# Patient Record
Sex: FEMALE | Race: BLACK OR AFRICAN AMERICAN | NOT HISPANIC OR LATINO
[De-identification: names, ages, dates, MRNs, and addresses within clinical notes are randomized per-mention and may not be internally consistent; named-entity substitution may affect disease eponyms.]

---

## 2017-02-06 ENCOUNTER — APPOINTMENT (OUTPATIENT)
Dept: SURGERY | Facility: CLINIC | Age: 27
End: 2017-02-06

## 2017-02-06 VITALS
RESPIRATION RATE: 16 BRPM | BODY MASS INDEX: 23.7 KG/M2 | OXYGEN SATURATION: 99 % | WEIGHT: 175 LBS | HEART RATE: 56 BPM | HEIGHT: 72 IN | SYSTOLIC BLOOD PRESSURE: 102 MMHG | TEMPERATURE: 98.4 F | DIASTOLIC BLOOD PRESSURE: 70 MMHG

## 2017-02-06 RX ORDER — CALCIUM CARBONATE 500(1250)
500 TABLET ORAL
Refills: 0 | Status: ACTIVE | COMMUNITY

## 2017-07-31 ENCOUNTER — APPOINTMENT (OUTPATIENT)
Dept: SURGERY | Facility: CLINIC | Age: 27
End: 2017-07-31

## 2017-11-13 ENCOUNTER — APPOINTMENT (OUTPATIENT)
Dept: SURGERY | Facility: CLINIC | Age: 27
End: 2017-11-13
Payer: MEDICAID

## 2017-11-13 VITALS
WEIGHT: 173 LBS | HEART RATE: 71 BPM | OXYGEN SATURATION: 98 % | RESPIRATION RATE: 16 BRPM | HEIGHT: 72 IN | BODY MASS INDEX: 23.43 KG/M2 | DIASTOLIC BLOOD PRESSURE: 81 MMHG | TEMPERATURE: 98.4 F | SYSTOLIC BLOOD PRESSURE: 121 MMHG

## 2017-11-13 PROCEDURE — 99213 OFFICE O/P EST LOW 20 MIN: CPT

## 2018-01-01 ENCOUNTER — OUTPATIENT (OUTPATIENT)
Dept: OUTPATIENT SERVICES | Facility: HOSPITAL | Age: 28
LOS: 1 days | End: 2018-01-01
Payer: MEDICAID

## 2018-01-01 DIAGNOSIS — Z98.84 BARIATRIC SURGERY STATUS: Chronic | ICD-10-CM

## 2018-01-01 DIAGNOSIS — Z98.89 OTHER SPECIFIED POSTPROCEDURAL STATES: Chronic | ICD-10-CM

## 2018-01-01 PROCEDURE — G9001: CPT

## 2018-01-05 ENCOUNTER — EMERGENCY (EMERGENCY)
Facility: HOSPITAL | Age: 28
LOS: 1 days | Discharge: HOME | End: 2018-01-05
Attending: EMERGENCY MEDICINE | Admitting: EMERGENCY MEDICINE
Payer: MEDICAID

## 2018-01-05 VITALS
OXYGEN SATURATION: 100 % | RESPIRATION RATE: 18 BRPM | SYSTOLIC BLOOD PRESSURE: 105 MMHG | DIASTOLIC BLOOD PRESSURE: 59 MMHG | HEART RATE: 94 BPM

## 2018-01-05 VITALS
TEMPERATURE: 99 F | RESPIRATION RATE: 18 BRPM | DIASTOLIC BLOOD PRESSURE: 65 MMHG | HEART RATE: 90 BPM | OXYGEN SATURATION: 100 % | SYSTOLIC BLOOD PRESSURE: 137 MMHG

## 2018-01-05 DIAGNOSIS — Z98.84 BARIATRIC SURGERY STATUS: Chronic | ICD-10-CM

## 2018-01-05 DIAGNOSIS — Z98.89 OTHER SPECIFIED POSTPROCEDURAL STATES: Chronic | ICD-10-CM

## 2018-01-05 LAB
ALBUMIN SERPL ELPH-MCNC: 4.4 G/DL — SIGNIFICANT CHANGE UP (ref 3.3–5)
ALP SERPL-CCNC: 56 U/L — SIGNIFICANT CHANGE UP (ref 40–120)
ALT FLD-CCNC: 18 U/L — SIGNIFICANT CHANGE UP (ref 4–33)
AMPHET UR-MCNC: NEGATIVE — SIGNIFICANT CHANGE UP
APAP SERPL-MCNC: < 15 UG/ML — LOW (ref 15–25)
APPEARANCE UR: SIGNIFICANT CHANGE UP
AST SERPL-CCNC: 23 U/L — SIGNIFICANT CHANGE UP (ref 4–32)
BARBITURATES MEASUREMENT: NEGATIVE — SIGNIFICANT CHANGE UP
BARBITURATES UR SCN-MCNC: NEGATIVE — SIGNIFICANT CHANGE UP
BASOPHILS # BLD AUTO: 0.05 K/UL — SIGNIFICANT CHANGE UP (ref 0–0.2)
BASOPHILS NFR BLD AUTO: 0.5 % — SIGNIFICANT CHANGE UP (ref 0–2)
BENZODIAZ SERPL-MCNC: NEGATIVE — SIGNIFICANT CHANGE UP
BENZODIAZ UR-MCNC: NEGATIVE — SIGNIFICANT CHANGE UP
BILIRUB SERPL-MCNC: 1 MG/DL — SIGNIFICANT CHANGE UP (ref 0.2–1.2)
BILIRUB UR-MCNC: NEGATIVE — SIGNIFICANT CHANGE UP
BLOOD UR QL VISUAL: HIGH
BUN SERPL-MCNC: 9 MG/DL — SIGNIFICANT CHANGE UP (ref 7–23)
CALCIUM SERPL-MCNC: 9.1 MG/DL — SIGNIFICANT CHANGE UP (ref 8.4–10.5)
CANNABINOIDS UR-MCNC: NEGATIVE — SIGNIFICANT CHANGE UP
CHLORIDE SERPL-SCNC: 103 MMOL/L — SIGNIFICANT CHANGE UP (ref 98–107)
CO2 SERPL-SCNC: 25 MMOL/L — SIGNIFICANT CHANGE UP (ref 22–31)
COCAINE METAB.OTHER UR-MCNC: NEGATIVE — SIGNIFICANT CHANGE UP
COLOR SPEC: YELLOW — SIGNIFICANT CHANGE UP
CREAT SERPL-MCNC: 0.8 MG/DL — SIGNIFICANT CHANGE UP (ref 0.5–1.3)
EOSINOPHIL # BLD AUTO: 0.05 K/UL — SIGNIFICANT CHANGE UP (ref 0–0.5)
EOSINOPHIL NFR BLD AUTO: 0.5 % — SIGNIFICANT CHANGE UP (ref 0–6)
ETHANOL BLD-MCNC: < 10 MG/DL — SIGNIFICANT CHANGE UP
GLUCOSE SERPL-MCNC: 93 MG/DL — SIGNIFICANT CHANGE UP (ref 70–99)
GLUCOSE UR-MCNC: NEGATIVE — SIGNIFICANT CHANGE UP
HCT VFR BLD CALC: 38.8 % — SIGNIFICANT CHANGE UP (ref 34.5–45)
HGB BLD-MCNC: 13.6 G/DL — SIGNIFICANT CHANGE UP (ref 11.5–15.5)
IMM GRANULOCYTES # BLD AUTO: 0.04 # — SIGNIFICANT CHANGE UP
IMM GRANULOCYTES NFR BLD AUTO: 0.4 % — SIGNIFICANT CHANGE UP (ref 0–1.5)
KETONES UR-MCNC: NEGATIVE — SIGNIFICANT CHANGE UP
LEUKOCYTE ESTERASE UR-ACNC: HIGH
LYMPHOCYTES # BLD AUTO: 1.39 K/UL — SIGNIFICANT CHANGE UP (ref 1–3.3)
LYMPHOCYTES # BLD AUTO: 12.8 % — LOW (ref 13–44)
MCHC RBC-ENTMCNC: 34.4 PG — HIGH (ref 27–34)
MCHC RBC-ENTMCNC: 35.1 % — SIGNIFICANT CHANGE UP (ref 32–36)
MCV RBC AUTO: 98.2 FL — SIGNIFICANT CHANGE UP (ref 80–100)
METHADONE UR-MCNC: POSITIVE — SIGNIFICANT CHANGE UP
MONOCYTES # BLD AUTO: 0.57 K/UL — SIGNIFICANT CHANGE UP (ref 0–0.9)
MONOCYTES NFR BLD AUTO: 5.2 % — SIGNIFICANT CHANGE UP (ref 2–14)
MUCOUS THREADS # UR AUTO: SIGNIFICANT CHANGE UP
NEUTROPHILS # BLD AUTO: 8.79 K/UL — HIGH (ref 1.8–7.4)
NEUTROPHILS NFR BLD AUTO: 80.6 % — HIGH (ref 43–77)
NITRITE UR-MCNC: NEGATIVE — SIGNIFICANT CHANGE UP
NRBC # FLD: 0 — SIGNIFICANT CHANGE UP
OPIATES UR-MCNC: NEGATIVE — SIGNIFICANT CHANGE UP
OXYCODONE UR-MCNC: NEGATIVE — SIGNIFICANT CHANGE UP
PCP UR-MCNC: NEGATIVE — SIGNIFICANT CHANGE UP
PH UR: 6 — SIGNIFICANT CHANGE UP (ref 4.6–8)
PLATELET # BLD AUTO: 337 K/UL — SIGNIFICANT CHANGE UP (ref 150–400)
PMV BLD: 9.5 FL — SIGNIFICANT CHANGE UP (ref 7–13)
POTASSIUM SERPL-MCNC: 3.9 MMOL/L — SIGNIFICANT CHANGE UP (ref 3.5–5.3)
POTASSIUM SERPL-SCNC: 3.9 MMOL/L — SIGNIFICANT CHANGE UP (ref 3.5–5.3)
PROT SERPL-MCNC: 7.9 G/DL — SIGNIFICANT CHANGE UP (ref 6–8.3)
PROT UR-MCNC: 30 MG/DL — HIGH
RBC # BLD: 3.95 M/UL — SIGNIFICANT CHANGE UP (ref 3.8–5.2)
RBC # FLD: 11.8 % — SIGNIFICANT CHANGE UP (ref 10.3–14.5)
RBC CASTS # UR COMP ASSIST: HIGH (ref 0–?)
SALICYLATES SERPL-MCNC: < 5 MG/DL — LOW (ref 15–30)
SODIUM SERPL-SCNC: 142 MMOL/L — SIGNIFICANT CHANGE UP (ref 135–145)
SP GR SPEC: 1.03 — SIGNIFICANT CHANGE UP (ref 1–1.04)
SQUAMOUS # UR AUTO: SIGNIFICANT CHANGE UP
TSH SERPL-MCNC: 0.84 UIU/ML — SIGNIFICANT CHANGE UP (ref 0.27–4.2)
UROBILINOGEN FLD QL: 1 MG/DL — SIGNIFICANT CHANGE UP
WBC # BLD: 10.89 K/UL — HIGH (ref 3.8–10.5)
WBC # FLD AUTO: 10.89 K/UL — HIGH (ref 3.8–10.5)
WBC UR QL: HIGH (ref 0–?)

## 2018-01-05 PROCEDURE — 99284 EMERGENCY DEPT VISIT MOD MDM: CPT | Mod: 25

## 2018-01-05 PROCEDURE — 93010 ELECTROCARDIOGRAM REPORT: CPT | Mod: 59

## 2018-01-05 RX ORDER — SODIUM CHLORIDE 9 MG/ML
1000 INJECTION INTRAMUSCULAR; INTRAVENOUS; SUBCUTANEOUS ONCE
Qty: 0 | Refills: 0 | Status: COMPLETED | OUTPATIENT
Start: 2018-01-05 | End: 2018-01-05

## 2018-01-05 RX ADMIN — Medication 50 MILLIGRAM(S): at 12:22

## 2018-01-05 RX ADMIN — SODIUM CHLORIDE 1000 MILLILITER(S): 9 INJECTION INTRAMUSCULAR; INTRAVENOUS; SUBCUTANEOUS at 12:22

## 2018-01-05 NOTE — ED ADULT TRIAGE NOTE - CHIEF COMPLAINT QUOTE
states" I drink every day since 1 year and did not drink from yesterday and feel shaky." last drink was last night. denies any pain

## 2018-01-05 NOTE — ED ADULT NURSE NOTE - OBJECTIVE STATEMENT
pt A&Ox3 c/o chest discomfort that started this morning with periods of sob and dizziness. pt states "I feel like my heart is coming out of my chest" pt feel like she is in alcohol withdrawal. pt has been abusing alcohol  for past year. pt states she drinks one bottle of wine a day with 10 shots of hard liquor throughout the day. pt also on methadone for past year, takes 5mg a day for opoid abuse. pt states she was trying to stop drinking an today was the first day. respirations equal and non labored. stable. will montior

## 2018-01-05 NOTE — ED PROVIDER NOTE - PHYSICAL EXAMINATION
hand tremors, no tongue fasciculations  normal vitals, appears comfortable  states that she is anxious hand tremors, no tongue fasciculations  normal vitals, appears comfortable  states that she is anxious ATTENDING PHYSICAL EXAM DR. SETHI ***GEN - NAD; well appearing; A+O x3 ***HEAD - NC/AT ***EYES/NOSE - PERRL, EOMI, mucous membranes moist, no discharge ***THROAT: Oral cavity and pharynx normal. No tongue fasciculations No inflammation, swelling, exudate, or lesions.  ***NECK: Neck supple, non-tender without lymphadenopathy, no masses, no thyromegaly.   ***PULMONARY - CTA b/l, symmetric breath sounds. ***CARDIAC -s1s2, RRR, no M,G,R  ***ABDOMEN - +BS, ND, NT, soft, no guarding, no rebound, no masses   ***BACK - no CVA tenderness, Normal  spine ***EXTREMITIES - symmetric pulses, 2+ dp, capillary refill < 2 seconds, no clubbing, no cyanosis, no edema ***SKIN - no rash or bruising   ***NEUROLOGIC - alert, mild tremors

## 2018-01-05 NOTE — ED PROVIDER NOTE - ATTENDING CONTRIBUTION TO CARE
I was physically present for the E/M service provided. I agree with above history, physical, and plan which I have reviewed and edited where appropriate. I was physically present for the key portions of the service provided. Mild ETOH withdrawal - no acute medical issues requesting outpatient detox information - labs / librium / reassess

## 2018-01-18 DIAGNOSIS — R69 ILLNESS, UNSPECIFIED: ICD-10-CM

## 2018-05-01 ENCOUNTER — OUTPATIENT (OUTPATIENT)
Dept: OUTPATIENT SERVICES | Facility: HOSPITAL | Age: 28
LOS: 1 days | End: 2018-05-01
Payer: MEDICAID

## 2018-05-01 DIAGNOSIS — Z98.84 BARIATRIC SURGERY STATUS: Chronic | ICD-10-CM

## 2018-05-01 DIAGNOSIS — Z98.89 OTHER SPECIFIED POSTPROCEDURAL STATES: Chronic | ICD-10-CM

## 2018-05-01 PROCEDURE — G9001: CPT

## 2018-05-03 DIAGNOSIS — R69 ILLNESS, UNSPECIFIED: ICD-10-CM

## 2018-05-21 ENCOUNTER — APPOINTMENT (OUTPATIENT)
Dept: SURGERY | Facility: CLINIC | Age: 28
End: 2018-05-21

## 2018-06-28 NOTE — ED PROVIDER NOTE - OBJECTIVE STATEMENT
Refill authorized.   27F with pmhx of oxycodone abuse/withdrawal and current alcohol abuse who presents to ED with chief complaint of tremors, chest pain, palpitations since this AM. Patient reports that she drinks 1 bottle of wine and 10 shots of hard alcohol per day for ~1 year. She normally has tremors every morning, and drinks during the day to help relieve her tremors. This morning, she woke up and noticed tremors much worse than usual as well as palpitations and a burning mid-sternal chest pain, non radiating, intermittent. She has not drank any alcohol yet today. Patient is currently enrolled in a methadone program and takes 5mg daily. Endorsing anxiety, tingling over her body, chills.    Currently denies fevers, nausea, vomiting, diarrhea, constipation, shortness of breath, dyspnea on exertion, cough, congestion, headache, confusion, dizziness, lightheadedness, blurry vision, dysuria, hematuria, numbness, weakness, fatigue, body aches. No recent travel, recent illness, sick contacts. No OCP use. Denies suicidal or homicidal ideations. No auditory or visual hallucinations.   Of note: Patient reports having similar constellation of symptoms (increased tremors, chest burning pain, chills) when withdrawing from oxycodone last year.   Smokes 1/2 pack cigs daily, 11 years. Denies any drug use other than methadone daily. No known allergies.   Patient expresses desire to enter an out-pt detox program, does not desire in-patient care.

## 2018-11-26 ENCOUNTER — APPOINTMENT (OUTPATIENT)
Dept: SURGERY | Facility: CLINIC | Age: 28
End: 2018-11-26
Payer: MEDICAID

## 2018-11-26 VITALS
TEMPERATURE: 98 F | BODY MASS INDEX: 25.73 KG/M2 | RESPIRATION RATE: 17 BRPM | HEIGHT: 72 IN | HEART RATE: 88 BPM | WEIGHT: 190 LBS | OXYGEN SATURATION: 99 % | SYSTOLIC BLOOD PRESSURE: 108 MMHG | DIASTOLIC BLOOD PRESSURE: 73 MMHG

## 2018-11-26 PROCEDURE — 99214 OFFICE O/P EST MOD 30 MIN: CPT

## 2019-08-19 ENCOUNTER — EMERGENCY (EMERGENCY)
Facility: HOSPITAL | Age: 29
LOS: 0 days | Discharge: HOME | End: 2019-08-19
Attending: EMERGENCY MEDICINE
Payer: MEDICAID

## 2019-08-19 VITALS
TEMPERATURE: 98 F | OXYGEN SATURATION: 99 % | SYSTOLIC BLOOD PRESSURE: 100 MMHG | HEART RATE: 85 BPM | DIASTOLIC BLOOD PRESSURE: 49 MMHG | WEIGHT: 190.04 LBS | RESPIRATION RATE: 18 BRPM

## 2019-08-19 DIAGNOSIS — Z98.84 BARIATRIC SURGERY STATUS: ICD-10-CM

## 2019-08-19 DIAGNOSIS — Z98.84 BARIATRIC SURGERY STATUS: Chronic | ICD-10-CM

## 2019-08-19 DIAGNOSIS — M25.532 PAIN IN LEFT WRIST: ICD-10-CM

## 2019-08-19 DIAGNOSIS — Z98.89 OTHER SPECIFIED POSTPROCEDURAL STATES: Chronic | ICD-10-CM

## 2019-08-19 DIAGNOSIS — R20.2 PARESTHESIA OF SKIN: ICD-10-CM

## 2019-08-19 PROCEDURE — 99284 EMERGENCY DEPT VISIT MOD MDM: CPT

## 2019-08-19 PROCEDURE — 93010 ELECTROCARDIOGRAM REPORT: CPT

## 2019-08-19 NOTE — ED ADULT TRIAGE NOTE - CHIEF COMPLAINT QUOTE
pt states " I woke up today and my left hand is weak and numb." last normal last night before bed. denies any medical history. denies chest pain.

## 2019-08-19 NOTE — ED PROVIDER NOTE - OBJECTIVE STATEMENT
28 yo female presents with left wrist pain for last few hours. Patient denies chest pain, neck pain, headache, weakness, trauma, abdominal pain, numbness

## 2019-08-19 NOTE — ED PROVIDER NOTE - CLINICAL SUMMARY MEDICAL DECISION MAKING FREE TEXT BOX
Patient left wrist pain, Patient refusing imaging ct head/cervical spine, will discharge with had surgeon f/u

## 2019-08-19 NOTE — ED ADULT NURSE NOTE - OBJECTIVE STATEMENT
Pt is a 29YOF who is here for numbness and tingling in her extremity, pt states she went to bed last night and was fine, she woke up this morning and she now has numbness in her left hand and tingling, pt states that it started this morning when she woke up.

## 2019-08-19 NOTE — ED ADULT NURSE REASSESSMENT NOTE - NS ED NURSE REASSESS COMMENT FT1
Pt able to ambulate safely and steadily w/out assistance, denies dizziness/weakness upon standing, no numbness noted, pt discharged home and paperwork was signed, reviewed with patient. Vital Signs recorded in the EMR, pt given follow up instructions and discharge treatment plan. Pt education deemed successful at time of discharge after teach back proves proficiency. Pt has no distress at time of discharge, pt provided discharge instructions, follow up care and results reviewed by MD. Reinforced by the RN at time of discharge.

## 2019-09-17 ENCOUNTER — APPOINTMENT (OUTPATIENT)
Dept: SURGERY | Facility: CLINIC | Age: 29
End: 2019-09-17

## 2020-01-13 ENCOUNTER — EMERGENCY (EMERGENCY)
Facility: HOSPITAL | Age: 30
LOS: 1 days | Discharge: HOME | End: 2020-01-13
Attending: INTERNAL MEDICINE | Admitting: INTERNAL MEDICINE
Payer: MEDICAID

## 2020-01-13 VITALS
HEART RATE: 73 BPM | DIASTOLIC BLOOD PRESSURE: 55 MMHG | RESPIRATION RATE: 18 BRPM | SYSTOLIC BLOOD PRESSURE: 91 MMHG | TEMPERATURE: 98 F | OXYGEN SATURATION: 99 %

## 2020-01-13 VITALS
HEART RATE: 74 BPM | SYSTOLIC BLOOD PRESSURE: 124 MMHG | RESPIRATION RATE: 18 BRPM | OXYGEN SATURATION: 100 % | TEMPERATURE: 98 F | DIASTOLIC BLOOD PRESSURE: 96 MMHG

## 2020-01-13 DIAGNOSIS — Z98.89 OTHER SPECIFIED POSTPROCEDURAL STATES: Chronic | ICD-10-CM

## 2020-01-13 DIAGNOSIS — Z98.84 BARIATRIC SURGERY STATUS: Chronic | ICD-10-CM

## 2020-01-13 DIAGNOSIS — F19.20 OTHER PSYCHOACTIVE SUBSTANCE DEPENDENCE, UNCOMPLICATED: ICD-10-CM

## 2020-01-13 LAB
ALBUMIN SERPL ELPH-MCNC: 4.1 G/DL — SIGNIFICANT CHANGE UP (ref 3.3–5)
ALP SERPL-CCNC: 51 U/L — SIGNIFICANT CHANGE UP (ref 40–120)
ALT FLD-CCNC: 14 U/L — SIGNIFICANT CHANGE UP (ref 4–33)
AMPHET UR-MCNC: NEGATIVE — SIGNIFICANT CHANGE UP
ANION GAP SERPL CALC-SCNC: 11 MMO/L — SIGNIFICANT CHANGE UP (ref 7–14)
APAP SERPL-MCNC: < 15 UG/ML — LOW (ref 15–25)
APPEARANCE UR: CLEAR — SIGNIFICANT CHANGE UP
AST SERPL-CCNC: 22 U/L — SIGNIFICANT CHANGE UP (ref 4–32)
BARBITURATES UR SCN-MCNC: NEGATIVE — SIGNIFICANT CHANGE UP
BASOPHILS # BLD AUTO: 0.05 K/UL — SIGNIFICANT CHANGE UP (ref 0–0.2)
BASOPHILS NFR BLD AUTO: 0.5 % — SIGNIFICANT CHANGE UP (ref 0–2)
BENZODIAZ UR-MCNC: POSITIVE — SIGNIFICANT CHANGE UP
BILIRUB SERPL-MCNC: 0.6 MG/DL — SIGNIFICANT CHANGE UP (ref 0.2–1.2)
BILIRUB UR-MCNC: NEGATIVE — SIGNIFICANT CHANGE UP
BLOOD UR QL VISUAL: NEGATIVE — SIGNIFICANT CHANGE UP
BUN SERPL-MCNC: 10 MG/DL — SIGNIFICANT CHANGE UP (ref 7–23)
CALCIUM SERPL-MCNC: 9.2 MG/DL — SIGNIFICANT CHANGE UP (ref 8.4–10.5)
CANNABINOIDS UR-MCNC: POSITIVE — SIGNIFICANT CHANGE UP
CHLORIDE SERPL-SCNC: 104 MMOL/L — SIGNIFICANT CHANGE UP (ref 98–107)
CO2 SERPL-SCNC: 24 MMOL/L — SIGNIFICANT CHANGE UP (ref 22–31)
COCAINE METAB.OTHER UR-MCNC: POSITIVE — SIGNIFICANT CHANGE UP
COLOR SPEC: YELLOW — SIGNIFICANT CHANGE UP
CREAT SERPL-MCNC: 0.79 MG/DL — SIGNIFICANT CHANGE UP (ref 0.5–1.3)
EOSINOPHIL # BLD AUTO: 0.2 K/UL — SIGNIFICANT CHANGE UP (ref 0–0.5)
EOSINOPHIL NFR BLD AUTO: 2 % — SIGNIFICANT CHANGE UP (ref 0–6)
ETHANOL BLD-MCNC: < 10 MG/DL — SIGNIFICANT CHANGE UP
GLUCOSE SERPL-MCNC: 90 MG/DL — SIGNIFICANT CHANGE UP (ref 70–99)
GLUCOSE UR-MCNC: NEGATIVE — SIGNIFICANT CHANGE UP
HCG UR-SCNC: NEGATIVE — SIGNIFICANT CHANGE UP
HCT VFR BLD CALC: 36.1 % — SIGNIFICANT CHANGE UP (ref 34.5–45)
HGB BLD-MCNC: 11.9 G/DL — SIGNIFICANT CHANGE UP (ref 11.5–15.5)
HIV COMBO RESULT: SIGNIFICANT CHANGE UP
HIV1+2 AB SPEC QL: SIGNIFICANT CHANGE UP
IMM GRANULOCYTES NFR BLD AUTO: 0.8 % — SIGNIFICANT CHANGE UP (ref 0–1.5)
KETONES UR-MCNC: NEGATIVE — SIGNIFICANT CHANGE UP
LEUKOCYTE ESTERASE UR-ACNC: NEGATIVE — SIGNIFICANT CHANGE UP
LYMPHOCYTES # BLD AUTO: 1.07 K/UL — SIGNIFICANT CHANGE UP (ref 1–3.3)
LYMPHOCYTES # BLD AUTO: 10.5 % — LOW (ref 13–44)
MCHC RBC-ENTMCNC: 31.9 PG — SIGNIFICANT CHANGE UP (ref 27–34)
MCHC RBC-ENTMCNC: 33 % — SIGNIFICANT CHANGE UP (ref 32–36)
MCV RBC AUTO: 96.8 FL — SIGNIFICANT CHANGE UP (ref 80–100)
METHADONE UR-MCNC: NEGATIVE — SIGNIFICANT CHANGE UP
MONOCYTES # BLD AUTO: 0.61 K/UL — SIGNIFICANT CHANGE UP (ref 0–0.9)
MONOCYTES NFR BLD AUTO: 6 % — SIGNIFICANT CHANGE UP (ref 2–14)
NEUTROPHILS # BLD AUTO: 8.21 K/UL — HIGH (ref 1.8–7.4)
NEUTROPHILS NFR BLD AUTO: 80.2 % — HIGH (ref 43–77)
NITRITE UR-MCNC: NEGATIVE — SIGNIFICANT CHANGE UP
NRBC # FLD: 0 K/UL — SIGNIFICANT CHANGE UP (ref 0–0)
OPIATES UR-MCNC: POSITIVE — SIGNIFICANT CHANGE UP
OXYCODONE UR-MCNC: NEGATIVE — SIGNIFICANT CHANGE UP
PCP UR-MCNC: POSITIVE — SIGNIFICANT CHANGE UP
PH UR: 8 — SIGNIFICANT CHANGE UP (ref 5–8)
PLATELET # BLD AUTO: 348 K/UL — SIGNIFICANT CHANGE UP (ref 150–400)
PMV BLD: 9.7 FL — SIGNIFICANT CHANGE UP (ref 7–13)
POTASSIUM SERPL-MCNC: 4.7 MMOL/L — SIGNIFICANT CHANGE UP (ref 3.5–5.3)
POTASSIUM SERPL-SCNC: 4.7 MMOL/L — SIGNIFICANT CHANGE UP (ref 3.5–5.3)
PROT SERPL-MCNC: 7.3 G/DL — SIGNIFICANT CHANGE UP (ref 6–8.3)
PROT UR-MCNC: 10 — SIGNIFICANT CHANGE UP
RBC # BLD: 3.73 M/UL — LOW (ref 3.8–5.2)
RBC # FLD: 12.3 % — SIGNIFICANT CHANGE UP (ref 10.3–14.5)
SALICYLATES SERPL-MCNC: < 5 MG/DL — LOW (ref 15–30)
SODIUM SERPL-SCNC: 139 MMOL/L — SIGNIFICANT CHANGE UP (ref 135–145)
SP GR SPEC: 1.02 — SIGNIFICANT CHANGE UP (ref 1–1.04)
T PALLIDUM AB TITR SER: NEGATIVE — SIGNIFICANT CHANGE UP
TSH SERPL-MCNC: 0.58 UIU/ML — SIGNIFICANT CHANGE UP (ref 0.27–4.2)
UROBILINOGEN FLD QL: HIGH
WBC # BLD: 10.22 K/UL — SIGNIFICANT CHANGE UP (ref 3.8–10.5)
WBC # FLD AUTO: 10.22 K/UL — SIGNIFICANT CHANGE UP (ref 3.8–10.5)

## 2020-01-13 PROCEDURE — 90792 PSYCH DIAG EVAL W/MED SRVCS: CPT

## 2020-01-13 PROCEDURE — 99284 EMERGENCY DEPT VISIT MOD MDM: CPT

## 2020-01-13 RX ORDER — AZITHROMYCIN 500 MG/1
1000 TABLET, FILM COATED ORAL ONCE
Refills: 0 | Status: COMPLETED | OUTPATIENT
Start: 2020-01-13 | End: 2020-01-13

## 2020-01-13 RX ORDER — CEFTRIAXONE 500 MG/1
250 INJECTION, POWDER, FOR SOLUTION INTRAMUSCULAR; INTRAVENOUS ONCE
Refills: 0 | Status: COMPLETED | OUTPATIENT
Start: 2020-01-13 | End: 2020-01-13

## 2020-01-13 RX ADMIN — Medication 50 MILLIGRAM(S): at 11:10

## 2020-01-13 RX ADMIN — Medication 50 MILLIGRAM(S): at 13:29

## 2020-01-13 RX ADMIN — CEFTRIAXONE 250 MILLIGRAM(S): 500 INJECTION, POWDER, FOR SOLUTION INTRAMUSCULAR; INTRAVENOUS at 15:55

## 2020-01-13 RX ADMIN — AZITHROMYCIN 1000 MILLIGRAM(S): 500 TABLET, FILM COATED ORAL at 15:55

## 2020-01-13 RX ADMIN — Medication 50 MILLIGRAM(S): at 17:11

## 2020-01-13 RX ADMIN — Medication 0.1 MILLIGRAM(S): at 13:29

## 2020-01-13 NOTE — ED BEHAVIORAL HEALTH NOTE - BEHAVIORAL HEALTH NOTE
Writer worked in collaboration with ED ALYSA Lo Castorena to assess SBIRT consult and provide pt with the appropriate resources. Pt has hx of polysubstance abuse. Pt cleared by psychiatry and medical team at this time. Writer met with pt at bedside. Pt was accompanied by her mother, Nidhi, who is supportive. Pt endorsed concerns for symptoms of withdrawal, presenting as anxious, and fixated on receiving "methodone". Pt reports she received medication for "alcohol but not heroin". Pt endorses recent use of both alcohol and heroin. Pt aware that medication requested (methodone) would not be offered to her in ED at this time. Pt unable to complete full SBIRT questionnaire due to symptoms preoccupation and fixation of medication/discharge. Pt would like to attend detox center. Pt recently at [facility] with behavioral health concerns noted. [facility] unwilling to accept pt back at this time. Writer discussed with pt and mother alternative options for detox. Pt and mother provided information for [facility]. Writer discussed [facility] in addition to which pt noted facility to be to [facility]. Pt interested in going to [facility] as a walk in with mother willing to drive her at this time. Pt also provided with additional detox centers in area. Pt was also provided with outpatient resources consisting of [facility], [facility], and additional outpatient resources in [facility]. ALYSA Lo Castorena also made outreach to ARS program for direct linkage. Pt and mother accepting of d/c plan. No concerns noted. Writer worked in collaboration with ED ALYSA Lo Castorena to assess SBIRT consult and provide pt with the appropriate resources. Pt has hx of polysubstance abuse. Pt cleared by psychiatry and medical team at this time. Writer met with pt at bedside. Pt was accompanied by her mother, Nidhi, who is supportive. Pt endorsed concerns for symptoms of withdrawal, presenting as anxious, and fixated on receiving "methodone". Pt reports she received medication for "alcohol but not heroin". Pt endorses recent use of both alcohol and heroin. Pt aware that medication requested (methodone) would not be offered to her in ED at this time. Pt unable to complete full SBIRT questionnaire due to symptoms preoccupation and fixation on medication/discharge. Pt identified just wanting to go although compliant with planning for safe discharge. Pt would like to attend detox center. Pt recently at [facility] with behavioral health concerns noted. [facility] unwilling to accept pt back at this time. Writer discussed with pt and mother alternative options for detox. Pt and mother provided information for [facility]. Writer discussed [facility] in addition to which pt noted facility to be to [facility]. Pt interested in going to [facility] as a walk in with mother willing to drive her at this time. Pt also provided with additional detox centers in [facility]. Pt was also provided with outpatient resources consisting of [facility], [facility] outpatient substance abuse, and additional outpatient resources in [facility]. ALYSA Castorena also made outreach to ARS program for direct linkage. Pt and mother accepting of d/c plan. No concerns noted.
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
## 2020-01-13 NOTE — ED PROVIDER NOTE - OBJECTIVE STATEMENT
29F w/ pmh morbid obesity s/p gastric sleeve; substance abuse (ETOH, heroin, cocaine), eczema -- p/w complaints of: full body itching, brown spots on her legs, and     History obtained from patient and her mother separately and together 29F w/ pmh morbid obesity s/p gastric sleeve; substance abuse (ETOH, heroin, cocaine), eczema -- p/w complaints of: full body itching, brown spots on her soles of feet (concerned about syphilis), thought she may have seen a bedbug 2 months ago     History obtained from patient and her mother separately and together 29F w/ pmh morbid obesity s/p gastric sleeve; substance abuse (ETOH, heroin, cocaine), eczema -- p/w complaints of: full body itching, brown spots on her soles of feet (concerned about syphilis), thought she may have seen a bedbug 2 months ago but denies seeing any since then; also concerned she wants to detox from alcohol and heroin abuse (last was last night, 1 bottle beer and 1 [place] iced tea - also used cocaine).     Mom reports concerned patient has been reporting paranoid thoughts - thinks she is being spied on, listening bugs are in her home, and that people are watching her from afar for her substance abuse - for at least a few weeks, worsening. No SI/HI, although patient has chronic unchanged activity of cutting her left wrist, but not because she wants to die from it.    History obtained from patient and her mother separately and together

## 2020-01-13 NOTE — ED BEHAVIORAL HEALTH ASSESSMENT NOTE - RISK ASSESSMENT
RISK ASSESSMENT:   The Pt is a low acute risk and is not at elevated chronic risk of self harm. Low Acute Suicide Risk RISK ASSESSMENT:   Patient has significant polysubstance dependence issues and NSSIB of cutting (?while intoxicated) which are contributory towards risks for suicide.  However, stable domicility along with full family support and a sense of responsibility towards her 8 yr old son, future orientation including concern from her family, Pt help seek and not manic or psychotic; no prior hx of SA nor family hx of SA all contribute towards mitigating suicide risk. The Pt denies SI/HI/I/P at this time or any time in her life.  At this time, she does not meet criteria for inpatient psychiatric hospitalization.

## 2020-01-13 NOTE — ED ADULT NURSE NOTE - OBJECTIVE STATEMENT
Pt awake and alert x 3 ambulatory into room reports may have been exposed to bed bugs at detox treatment program she was at. Pt vs wnl. no nausea or vomiting noted skin wnl, pt denies chest pain no signs of sob noted, iv placed blood and urine collected, pt tolerated small amount of breakfast now awaiting dispo. Pts mother at bedside. Pt requesting detox from alcohol and Heroine .

## 2020-01-13 NOTE — ED BEHAVIORAL HEALTH ASSESSMENT NOTE - TREATMENT
3 prior detox/rehab programs in 2019 3 prior detox/rehab programs in 2019 - most recent at [facility] see HPI for details

## 2020-01-13 NOTE — ED BEHAVIORAL HEALTH ASSESSMENT NOTE - DESCRIPTION
Since her ED admission, the Pt has not been aggressive/violent.  She is complaining of body aches and chills but denied having diarrhea, diaphoresis, inc lacrimation nor rhinorrhea.  Her pupils are slowly reactive bilaterally at 3mm each; no nystagmus noted nor any piloerection noted.  Pt denied harboring any active/passive SI/HI.  She feels safe here.  denied currently experiencing any perceptual disturbances.      Vital Signs Last 24 Hrs  T(C): 36.7 (13 Jan 2020 11:38), Max: 36.8 (13 Jan 2020 10:30)  T(F): 98.1 (13 Jan 2020 11:38), Max: 98.2 (13 Jan 2020 10:30)  HR: 72 (13 Jan 2020 12:44) (72 - 74)  BP: 100/54 (13 Jan 2020 12:44) (92/53 - 124/96)  BP(mean): --  RR: 18 (13 Jan 2020 12:44) (18 - 18)  SpO2: 100% (13 Jan 2020 12:44) (100% - 100%)  LABS: TOX SCREEN: + Benzo, THC, Cocaine, opiate and PCP; BAL < 10                        11.9   10.22 )-----------( 348      ( 13 Jan 2020 10:18 )             36.1     13 Jan 2020 10:18    139    |  104    |  10     ----------------------------<  90     4.7     |  24     |  0.79     Ca    9.2        13 Jan 2020 10:18    TPro  7.3    /  Alb  4.1    /  TBili  0.6    /  DBili  x      /  AST  22     /  ALT  14     /  AlkPhos  51     13 Jan 2020 10:18 HTN, GERD, arthropathy, eczema, asthma, vit D Def, INNA; s/p Gastric sleeve, s/p  single, has 8 yr old son, living with parents

## 2020-01-13 NOTE — ED PROVIDER NOTE - PATIENT PORTAL LINK FT
You can access the FollowMyHealth Patient Portal offered by [facility] by registering at the following website: [email or web address]. By joining [facility]’s FollowMyHealth portal, you will also be able to view your health information using other applications (apps) compatible with our system.

## 2020-01-13 NOTE — ED ADULT TRIAGE NOTE - CHIEF COMPLAINT QUOTE
Pt c/o chills, fever, lesions on feet.  Also c/o having bed bugs.  Says she's in alcohol and heroin withdrawal.  Last use was last night along with crack cocaine

## 2020-01-13 NOTE — ED PROVIDER NOTE - NSFOLLOWUPINSTRUCTIONS_ED_ALL_ED_FT
Follow up with your primary care doctor, and a psychiatrist outpatient within the next 3-5 days.    Please take ACETAMINOPHEN and IBUPROFEN as directed for your discomfort.

## 2020-01-13 NOTE — ED PROVIDER NOTE - CLINICAL SUMMARY MEDICAL DECISION MAKING FREE TEXT BOX
pt w/ multilple complaints - will check STD, also for uti - no evidence of bedbugs on exam, no evidence of scabies, concern for paranoia - will perform medical workup, then likely consult [facility] pt w/ multilple complaints - will check STD, also for uti - no evidence of bedbugs on exam, no evidence of scabies, concern for paranoia - will perform medical workup, then likely consult     Dr. Dang: I agree with the above provided history and exam and addend/modify it as follows.  29 female with history of morbid obesity s/p gastric sleeve; substance abuse (alcohol, heroin, cocaine: history of prior alcohol withdrawal), eczema Presenting requesting sexually transmitted disease testing due to vaginal discharge, concern for bed bugs due to pruritis. Paranoid behavior with cutting: denies attempts today.  Possibel new psychosis: likely effect of cocaine.  No evidence of bedbugs.  Plan for labs with toxicologic screen, librium for prophylaxis against withdrawal, STD testing.  Psych consult.

## 2020-01-13 NOTE — ED BEHAVIORAL HEALTH ASSESSMENT NOTE - OTHER
ANGEL I STOP reference # [id number] - NO RECENT CONTROLLED SUBSTANCES PRESCRIBED; LAST PRESCRIBED SUBOXONE 8/2 mg x 21 films for a 7 day supply by Dr Angela Hatch-Lauri on 11/26/2019 see HPI for details see HPI for more details mother

## 2020-01-13 NOTE — ED BEHAVIORAL HEALTH ASSESSMENT NOTE - PAST PSYCHOTROPIC MEDICATION
per psyckes: ambien, xanax, librium, oxycodone, oxycodone-acetaminophen, lexapro, clonidine, trazodone, zoloft, buspar, amitriptyline
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
## 2020-01-13 NOTE — ED BEHAVIORAL HEALTH ASSESSMENT NOTE - SAFETY PLAN ADDT'L DETAILS
Safety plan discussed with.../Education provided regarding environmental safety / lethal means restriction/Provision of National Suicide Prevention Lifeline 8-570-331-XBEP (3453)

## 2020-01-13 NOTE — PROVIDER CONTACT NOTE (OTHER) - ASSESSMENT
fitzk notified regarding pt.  pt had medical discharge from [facility] on   due to feeling depressed.  Pt sent to hospital and was held for 48 hours and released.  Pt now denies any SI/HI.  Pt states that she is taking her Lexapro but needs something else to assist her with her depression and anxiety.  Pt also stating that she wants methadone due to feeling withdrawal occurring.  Pt states that she had subaxon at home but makes her nauseous and will not take it.  [facility] ED psychiatry evaluated the patient and

## 2020-01-13 NOTE — ED PROVIDER NOTE - SKIN [+], MLM
Seeing psychiatrist (Dr. Palomo) who gives her meds; some relief. Stopped talk therapy (with Dr. Sapp). On two meds but patient can't remember--  Will call     ITCH

## 2020-01-13 NOTE — SBIRT NOTE ADULT - NSSBIRTUNABLESCROTHER_GEN_A_CORE
Pt verbalized not feeling well and was fixated on discharge for detox. Writer and ALYSA Castorena worked in collaboration to provide pt with the appropriate resources. Please refer to  ED NOTE for additional information. Pt verbalized not feeling well and was fixated on discharge for detox. Writer and ALYSA Castorena worked in collaboration to provide pt with the appropriate resources. Please refer to ED  NOTE for additional information.

## 2020-01-13 NOTE — ED BEHAVIORAL HEALTH ASSESSMENT NOTE - DIFFERENTIAL
depressive disorder NOS  anxiety disorder NOS  substance induced psychosis  polysubstance dependence

## 2020-01-13 NOTE — ED BEHAVIORAL HEALTH ASSESSMENT NOTE - ORAL MEDICATION DETAILS
ibrium 50mg x 1 dose at 9212AM; this was followed by another Librium 50mg x 1 dose as well as Catapres 0.1mg x 1 dose at 121PM.

## 2020-01-13 NOTE — ED PROVIDER NOTE - ATTENDING CONTRIBUTION TO CARE
WALTER Dang MD performed a history and physical exam of the patient and discussed their management with the resident and /or advanced care provider. I reviewed the resident and /or ACP's note and agree with the documented findings and plan of care. My medical decision making and observations are found above.    Awake, Alert, Conversant.  Resting comfortably.  Breath sounds clear in all lung fields.  Normal and regular heart rate without murmurs, rubs, or gallops.  Normal S1/S2.  Abdomen soft and nontender.  No lower extremity swelling or tenderness.  Excoriations with shallow cut to left wrist.  No visible bites marks and no insects seen on exam.  Oriented and conversant with fluent speech, moving all extremities with good strength.   The patients mother notes that she developed paranoia over the past several months which the patient denies: does not appear to be responding to internal stimuli at this time.  Admits to cutting but denies suicidal attempt or ideation.    Dr. Dang: I agree with the above provided history and exam and addend/modify it as follows.  29 female with history of morbid obesity s/p gastric sleeve; substance abuse (alcohol, heroin, cocaine: history of prior alcohol withdrawal), eczema Presenting requesting sexually transmitted disease testing due to vaginal discharge, concern for bed bugs due to pruritis. Paranoid behavior with cutting: denies attempts today.  Plan for labs with toxicologic screen, librium for prophylaxis against withdrawal, STD testing.  Psych consult. WALTER Dang MD performed a history and physical exam of the patient and discussed their management with the resident and /or advanced care provider. I reviewed the resident and /or ACP's note and agree with the documented findings and plan of care. My medical decision making and observations are found above.    Awake, Alert, Conversant.  Resting comfortably.  Breath sounds clear in all lung fields.  Normal and regular heart rate without murmurs, rubs, or gallops.  Normal S1/S2.  Abdomen soft and nontender.  No lower extremity swelling or tenderness.  Excoriations with shallow cut to left wrist.  No visible bites marks and no insects seen on exam.  Oriented and conversant with fluent speech, moving all extremities with good strength.   The patients mother notes that she developed paranoia over the past several months which the patient denies: does not appear to be responding to internal stimuli at this time.  Admits to cutting but denies suicidal attempt or ideation.

## 2020-01-13 NOTE — ED BEHAVIORAL HEALTH ASSESSMENT NOTE - SUICIDE PROTECTIVE FACTORS
Fear of death or the actual act of killing self/Positive therapeutic relationships/Responsibility to family and others/Identifies reasons for living/Has future plans/Supportive social network of family or friends

## 2020-01-13 NOTE — ED BEHAVIORAL HEALTH ASSESSMENT NOTE - SELF INJURIOUS BEHAVIOR WITHOUT SUICIDAL INTENT:
None known Tranexamic Acid Pregnancy And Lactation Text: It is unknown if this medication is safe during pregnancy or breast feeding.

## 2020-01-13 NOTE — ED BEHAVIORAL HEALTH ASSESSMENT NOTE - SUMMARY
29/F with unclear hx of depression, had 1 in-Pt psych admission at age 15, no hx of SA but has hx of self injurious behavior (thru cutting); has polysubstance dependence (reportedly, claims only using Heroin and alcohol but per mother, been abusing crack, crack cocaine, THC as well).  Today, presented to the ED as she was initially complaining of experiencing fever, chills, ? lesions on her feet.  During her stay at the main ED, mother verbalized concerned towards the ED attending that Pt has been complaining of bed bugs (with last use of crack x last night) and paranoia.  A psych consult was requested for assessment of paranoia. Since her ED admission, the Pt has not been aggressive/violent.  She is complaining of body aches and chills but denied having diarrhea, diaphoresis, inc lacrimation nor rhinorrhea.  Her pupils are slowly reactive bilaterally at 3mm each; no nystagmus noted nor any piloerection noted.  Pt denied harboring any active/passive SI/HI.  She feels safe here.  denied currently experiencing any perceptual disturbances.   At this time, the Pt's presentation is consistent with substance induced psychosis rather than a primary psychotic episode.  Formication is likely consequential of cocaine + alcohol abuse.  The Pt is not suicidal or homicidal.  She is not showing any severe MDD symptoms; there is no signs/symptoms suggestive of acute homer or florid psychosis.  Collateral information obtained from the mother who reported that Pt has long hx of polysubstance abuse and has attended numerous detox/rehab drug program but has frequently relapsed.  Mother expressed willingness to accompany Pt to all her scheduled substance abuse clinic appts (once established).  Extensively discussed to Pt and mother that at this time, there is no indication to warrant in-Pt psych admission as Pt is not suicidal/homicidal/acutely manic or floridly psychotic.  Mother did not object towards proposed treatment plan.  From the psychiatric perspective, the pt is psychiatrically cleared for discharge.      RECOMMENDATIONS:   1. Psychoeducation provided. extensively discussed impact of illicit drugs/alcohol abuse on health and well being as well as the importance of sobriety.  Pt expressed willingness and mother is in full support towards pursuing attendance into a substance abuse program.    2. Emergency protocol reviewed.  adviced Pt or mother to call 911 or proceed to the nearest ED should Pt feeling increasingly agitated/anxious, having SI/HI; having worsening paranoia/AH/VH/tactile hallucinations or with signs/symptoms of intox/withdrawal  3. no psych meds prescribed  4. SBIRT to intervene regarding discussion on resources for substance abuse  - updated Dr Dang re: dispo
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
## 2020-01-14 LAB
C TRACH RRNA SPEC QL NAA+PROBE: SIGNIFICANT CHANGE UP
N GONORRHOEA RRNA SPEC QL NAA+PROBE: SIGNIFICANT CHANGE UP
SPECIMEN SOURCE: SIGNIFICANT CHANGE UP
SPECIMEN SOURCE: SIGNIFICANT CHANGE UP

## 2020-01-15 LAB — BACTERIA UR CULT: SIGNIFICANT CHANGE UP

## 2020-05-01 ENCOUNTER — OUTPATIENT (OUTPATIENT)
Dept: OUTPATIENT SERVICES | Facility: HOSPITAL | Age: 30
LOS: 1 days | End: 2020-05-01
Payer: COMMERCIAL

## 2020-05-01 DIAGNOSIS — Z98.84 BARIATRIC SURGERY STATUS: Chronic | ICD-10-CM

## 2020-05-01 DIAGNOSIS — Z98.89 OTHER SPECIFIED POSTPROCEDURAL STATES: Chronic | ICD-10-CM

## 2020-05-01 PROCEDURE — G9001: CPT

## 2020-05-19 ENCOUNTER — EMERGENCY (EMERGENCY)
Facility: HOSPITAL | Age: 30
LOS: 1 days | Discharge: HOME | End: 2020-05-19
Attending: EMERGENCY MEDICINE | Admitting: EMERGENCY MEDICINE
Payer: MEDICAID

## 2020-05-19 VITALS
SYSTOLIC BLOOD PRESSURE: 126 MMHG | HEART RATE: 83 BPM | TEMPERATURE: 99 F | DIASTOLIC BLOOD PRESSURE: 71 MMHG | RESPIRATION RATE: 14 BRPM | OXYGEN SATURATION: 99 %

## 2020-05-19 VITALS
HEART RATE: 73 BPM | OXYGEN SATURATION: 98 % | DIASTOLIC BLOOD PRESSURE: 60 MMHG | SYSTOLIC BLOOD PRESSURE: 117 MMHG | RESPIRATION RATE: 16 BRPM | TEMPERATURE: 98 F

## 2020-05-19 DIAGNOSIS — Z98.84 BARIATRIC SURGERY STATUS: Chronic | ICD-10-CM

## 2020-05-19 DIAGNOSIS — Z98.89 OTHER SPECIFIED POSTPROCEDURAL STATES: Chronic | ICD-10-CM

## 2020-05-19 DIAGNOSIS — F19.920 OTHER PSYCHOACTIVE SUBSTANCE USE, UNSPECIFIED WITH INTOXICATION, UNCOMPLICATED: ICD-10-CM

## 2020-05-19 DIAGNOSIS — F19.10 OTHER PSYCHOACTIVE SUBSTANCE ABUSE, UNCOMPLICATED: ICD-10-CM

## 2020-05-19 LAB
ALBUMIN SERPL ELPH-MCNC: 4.2 G/DL — SIGNIFICANT CHANGE UP (ref 3.3–5)
ALP SERPL-CCNC: 65 U/L — SIGNIFICANT CHANGE UP (ref 40–120)
ALT FLD-CCNC: 14 U/L — SIGNIFICANT CHANGE UP (ref 4–33)
ANION GAP SERPL CALC-SCNC: 11 MMO/L — SIGNIFICANT CHANGE UP (ref 7–14)
APAP SERPL-MCNC: < 15 UG/ML — LOW (ref 15–25)
AST SERPL-CCNC: 33 U/L — HIGH (ref 4–32)
BASOPHILS # BLD AUTO: 0.03 K/UL — SIGNIFICANT CHANGE UP (ref 0–0.2)
BASOPHILS NFR BLD AUTO: 0.3 % — SIGNIFICANT CHANGE UP (ref 0–2)
BILIRUB SERPL-MCNC: 0.3 MG/DL — SIGNIFICANT CHANGE UP (ref 0.2–1.2)
BUN SERPL-MCNC: 14 MG/DL — SIGNIFICANT CHANGE UP (ref 7–23)
CALCIUM SERPL-MCNC: 9.1 MG/DL — SIGNIFICANT CHANGE UP (ref 8.4–10.5)
CHLORIDE SERPL-SCNC: 107 MMOL/L — SIGNIFICANT CHANGE UP (ref 98–107)
CO2 SERPL-SCNC: 23 MMOL/L — SIGNIFICANT CHANGE UP (ref 22–31)
CREAT SERPL-MCNC: 0.92 MG/DL — SIGNIFICANT CHANGE UP (ref 0.5–1.3)
EOSINOPHIL # BLD AUTO: 0.17 K/UL — SIGNIFICANT CHANGE UP (ref 0–0.5)
EOSINOPHIL NFR BLD AUTO: 1.8 % — SIGNIFICANT CHANGE UP (ref 0–6)
ETHANOL BLD-MCNC: < 10 MG/DL — SIGNIFICANT CHANGE UP
GLUCOSE SERPL-MCNC: 105 MG/DL — HIGH (ref 70–99)
HCT VFR BLD CALC: 31.8 % — LOW (ref 34.5–45)
HGB BLD-MCNC: 10.6 G/DL — LOW (ref 11.5–15.5)
IMM GRANULOCYTES NFR BLD AUTO: 0.3 % — SIGNIFICANT CHANGE UP (ref 0–1.5)
LYMPHOCYTES # BLD AUTO: 1.72 K/UL — SIGNIFICANT CHANGE UP (ref 1–3.3)
LYMPHOCYTES # BLD AUTO: 18.6 % — SIGNIFICANT CHANGE UP (ref 13–44)
MCHC RBC-ENTMCNC: 32 PG — SIGNIFICANT CHANGE UP (ref 27–34)
MCHC RBC-ENTMCNC: 33.3 % — SIGNIFICANT CHANGE UP (ref 32–36)
MCV RBC AUTO: 96.1 FL — SIGNIFICANT CHANGE UP (ref 80–100)
MONOCYTES # BLD AUTO: 0.77 K/UL — SIGNIFICANT CHANGE UP (ref 0–0.9)
MONOCYTES NFR BLD AUTO: 8.3 % — SIGNIFICANT CHANGE UP (ref 2–14)
NEUTROPHILS # BLD AUTO: 6.54 K/UL — SIGNIFICANT CHANGE UP (ref 1.8–7.4)
NEUTROPHILS NFR BLD AUTO: 70.7 % — SIGNIFICANT CHANGE UP (ref 43–77)
NRBC # FLD: 0 K/UL — SIGNIFICANT CHANGE UP (ref 0–0)
PLATELET # BLD AUTO: 250 K/UL — SIGNIFICANT CHANGE UP (ref 150–400)
PMV BLD: 10.8 FL — SIGNIFICANT CHANGE UP (ref 7–13)
POTASSIUM SERPL-MCNC: 3.9 MMOL/L — SIGNIFICANT CHANGE UP (ref 3.5–5.3)
POTASSIUM SERPL-SCNC: 3.9 MMOL/L — SIGNIFICANT CHANGE UP (ref 3.5–5.3)
PROT SERPL-MCNC: 7.5 G/DL — SIGNIFICANT CHANGE UP (ref 6–8.3)
RBC # BLD: 3.31 M/UL — LOW (ref 3.8–5.2)
RBC # FLD: 13.1 % — SIGNIFICANT CHANGE UP (ref 10.3–14.5)
SALICYLATES SERPL-MCNC: < 5 MG/DL — LOW (ref 15–30)
SODIUM SERPL-SCNC: 141 MMOL/L — SIGNIFICANT CHANGE UP (ref 135–145)
WBC # BLD: 9.26 K/UL — SIGNIFICANT CHANGE UP (ref 3.8–10.5)
WBC # FLD AUTO: 9.26 K/UL — SIGNIFICANT CHANGE UP (ref 3.8–10.5)

## 2020-05-19 PROCEDURE — 71045 X-RAY EXAM CHEST 1 VIEW: CPT | Mod: 26

## 2020-05-19 PROCEDURE — 90792 PSYCH DIAG EVAL W/MED SRVCS: CPT

## 2020-05-19 PROCEDURE — 99284 EMERGENCY DEPT VISIT MOD MDM: CPT

## 2020-05-19 NOTE — ED BEHAVIORAL HEALTH ASSESSMENT NOTE - NS ED BHA BENZODIAZEPINES
Chronic in nature. Stable. Patient continues to follow with Dr. Martinez. Discussed the patient continued follow-up with Dr. Martinez.    Patient continues seeing Dr. Montiel, a gynecologist, regarding what she states are elevated estrogen level. Patient states that a lot of the symptoms she is complaining of today started when she started taking low-dose naltrexone states that she is also taking multiple supplements to this provider including a supplement taken by body builders, patient states she does not have a list of supplements discussed with patient that if she provides a list of supplements I will try and look into whether or not these could be causing her symptoms. Discussed with patient that naltrexone can cause fatigue, depression, dizziness.    Yes

## 2020-05-19 NOTE — ED PROVIDER NOTE - PROGRESS NOTE DETAILS
Spoke with [facility], confirmed 80 mg Methadone dose. Was last given on 5/17/20. - Ethan Gordon,  PGY-1 Per psych, cleared from psych standpoint for discharge. Seen by SW bedside with resources provided for follow up. Patient appears well, awake, alert, oriented, clinically sober, no signs of withdrawal.

## 2020-05-19 NOTE — ED BEHAVIORAL HEALTH ASSESSMENT NOTE - VIOLENCE PROTECTIVE FACTORS:
Residential stability/Relationship stability/Engagement in treatment Insight into violence risk and need for management/treatment/Relationship stability

## 2020-05-19 NOTE — ED BEHAVIORAL HEALTH ASSESSMENT NOTE - DIFFERENTIAL
depressive disorder NOS  anxiety disorder NOS  substance induced psychosis  polysubstance dependence substance-induced psychosis/mood disorder  unspecified psychosis  unspecified depression

## 2020-05-19 NOTE — ED BEHAVIORAL HEALTH ASSESSMENT NOTE - OTHER
mother ANGEL I STOP reference # [id number] - NO RECENT CONTROLLED SUBSTANCES PRESCRIBED; LAST PRESCRIBED SUBOXONE 8/2 mg x 21 films for a 7 day supply by Dr Angela Hatch-Lauri on 11/26/2019 see HPI for details see HPI for more details currently homeless "sometimes depressed" constricted

## 2020-05-19 NOTE — PROVIDER CONTACT NOTE (OTHER) - ASSESSMENT
gloria met with the pt.  Pt recently kick out her [facility] program, Bx due to using.  pt is requesting detox.  Pt last used 2 bags this morning.  Gloria gave pt information regarding SBIRT and outpt services as well as homeless shelters if needed.  Pt given metro card to use upon her d/c.

## 2020-05-19 NOTE — ED ADULT TRIAGE NOTE - CHIEF COMPLAINT QUOTE
Pt states she hasn't had her methadone in 2 days and sniffed heroin this AM. Pt presently nodding off and states "people are after me". Pt reports SI and feeling depressed; to be seen in main on 1:1 as per FIT MD
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
## 2020-05-19 NOTE — ED PROVIDER NOTE - OBJECTIVE STATEMENT
29 y/o F with reported PMH of asthma, bipolar disorder, manic depression, anxiety presenting from home for detox. Patient states she was kicked out of her methadone program as she has started using heroin again. Last used 2 bags around 930 AM as well as crack. Patient states she has been more paranoid over last week. Denies auditory/visual hallucinations. Admits to having thought of harming herself, but denies SI/HI. Requesting detox and methadone.    From [facility] in the [place].

## 2020-05-19 NOTE — ED ADULT NURSE REASSESSMENT NOTE - NS ED NURSE REASSESS COMMENT FT1
Pt  in NAD, sleeping in stretcher, belongings placed across from room 21, valuable sent down to security. Labs drawn and sent, will continue to monitor.

## 2020-05-19 NOTE — ED BEHAVIORAL HEALTH ASSESSMENT NOTE - SUICIDE PROTECTIVE FACTORS
Identifies reasons for living/Has future plans/Fear of death or the actual act of killing self/Positive therapeutic relationships/Supportive social network of family or friends/Responsibility to family and others Identifies reasons for living/Has future plans/Supportive social network of family or friends/Responsibility to family and others/Fear of death or the actual act of killing self

## 2020-05-19 NOTE — ED PROVIDER NOTE - CLINICAL SUMMARY MEDICAL DECISION MAKING FREE TEXT BOX
31 y/o F presenting for detox, last used drugs this AM. Will obtain screening labs, EKG, CXR, likely BH pending results as patient expressing that she has thoughts of hurting herself. Will contact methadone program to verify dosing - Ethan Gordon, DO PGY-1

## 2020-05-19 NOTE — ED BEHAVIORAL HEALTH ASSESSMENT NOTE - SUMMARY
29/F with unclear hx of depression, had 1 in-Pt psych admission at age 15, no hx of SA but has hx of self injurious behavior (thru cutting); has polysubstance dependence (reportedly, claims only using Heroin and alcohol but per mother, been abusing crack, crack cocaine, THC as well).  Today, presented to the ED as she was initially complaining of experiencing fever, chills, ? lesions on her feet.  During her stay at the main ED, mother verbalized concerned towards the ED attending that Pt has been complaining of bed bugs (with last use of crack x last night) and paranoia.  A psych consult was requested for assessment of paranoia. Since her ED admission, the Pt has not been aggressive/violent.  She is complaining of body aches and chills but denied having diarrhea, diaphoresis, inc lacrimation nor rhinorrhea.  Her pupils are slowly reactive bilaterally at 3mm each; no nystagmus noted nor any piloerection noted.  Pt denied harboring any active/passive SI/HI.  She feels safe here.  denied currently experiencing any perceptual disturbances.   At this time, the Pt's presentation is consistent with substance induced psychosis rather than a primary psychotic episode.  Formication is likely consequential of cocaine + alcohol abuse.  The Pt is not suicidal or homicidal.  She is not showing any severe MDD symptoms; there is no signs/symptoms suggestive of acute homer or florid psychosis.  Collateral information obtained from the mother who reported that Pt has long hx of polysubstance abuse and has attended numerous detox/rehab drug program but has frequently relapsed.  Mother expressed willingness to accompany Pt to all her scheduled substance abuse clinic appts (once established).  Extensively discussed to Pt and mother that at this time, there is no indication to warrant in-Pt psych admission as Pt is not suicidal/homicidal/acutely manic or floridly psychotic.  Mother did not object towards proposed treatment plan.  From the psychiatric perspective, the pt is psychiatrically cleared for discharge.      RECOMMENDATIONS:   1. Psychoeducation provided. extensively discussed impact of illicit drugs/alcohol abuse on health and well being as well as the importance of sobriety.  Pt expressed willingness and mother is in full support towards pursuing attendance into a substance abuse program.    2. Emergency protocol reviewed.  adviced Pt or mother to call 911 or proceed to the nearest ED should Pt feeling increasingly agitated/anxious, having SI/HI; having worsening paranoia/AH/VH/tactile hallucinations or with signs/symptoms of intox/withdrawal  3. no psych meds prescribed  4. SBIRT to intervene regarding discussion on resources for substance abuse  - updated Dr Dang re: dispo 30 yr old  female, single, has an 8 yr old son who is currently staying with pt's mother, Pt has unclear hx of depression, had 1 in-Pt psych admission at age 15, no hx of SA but has hx of self injurious behavior (thru cutting); has polysubstance dependence (heroin, crack-cocaine, alcohol, marijuana), no h/o violence or legal issues, PMH HTN, GERD, arthropathy, eczema, asthma, vit D Def, INNA; s/p Gastric sleeve, BIBEMS activated by mother for drug intoxication and paranoia.     Pt denied harboring any active/passive SI/HI.  She feels safe here.  denied currently experiencing any perceptual disturbances.   At this time, the Pt's presentation is consistent with substance induced psychosis rather than a primary psychotic episode.  Formication is likely consequential of cocaine + alcohol abuse.  The Pt is not suicidal or homicidal.  She is not showing any severe MDD symptoms; there is no signs/symptoms suggestive of acute homer or florid psychosis.  Collateral information obtained from the mother who reported that Pt has long hx of polysubstance abuse and has attended numerous detox/rehab drug program but has frequently relapsed.  Mother expressed willingness to accompany Pt to all her scheduled substance abuse clinic appts (once established).  Extensively discussed to Pt and mother that at this time, there is no indication to warrant in-Pt psych admission as Pt is not suicidal/homicidal/acutely manic or floridly psychotic.  Mother did not object towards proposed treatment plan.  From the psychiatric perspective, the pt is psychiatrically cleared for discharge.      RECOMMENDATIONS:   1. Psychoeducation provided. extensively discussed impact of illicit drugs/alcohol abuse on health and well being as well as the importance of sobriety.  Pt expressed willingness and mother is in full support towards pursuing attendance into a substance abuse program.    2. Emergency protocol reviewed.  adviced Pt or mother to call 911 or proceed to the nearest ED should Pt feeling increasingly agitated/anxious, having SI/HI; having worsening paranoia/AH/VH/tactile hallucinations or with signs/symptoms of intox/withdrawal  3. no psych meds prescribed  4. SBIRT to intervene regarding discussion on resources for substance abuse  - updated Dr Dang re: dispo 30 yr old  female, single, has an 8 yr old son who is currently staying with pt's mother, Pt has unclear hx of depression, had 1 in-Pt psych admission at age 15, no hx of SA but has hx of self injurious behavior (thru cutting); has polysubstance dependence (heroin, crack-cocaine, alcohol, marijuana), no h/o violence or legal issues, PMH HTN, GERD, arthropathy, eczema, asthma, vit D Def, INNA; s/p Gastric sleeve, BIBEMS activated by mother for drug intoxication and paranoia.   Patient reports feeling that people in her neighborhood are after her. Unclear how much of this is reality-based vs chayo paranoia. No overt delusional content elicited. She currently denies SI/HI. Pt's possible paranoia and intermittent passive SI are likely substance-induced given her heavy, frequent drug use. Currently, there are no signs/symptoms suggestive of acute homer or depression or florid psychosis. Pt does not present an acute danger to self or others and does not require inpatient psychiatric admission at this natasha. SW provided pt with substance abuse treatment referrals.

## 2020-05-19 NOTE — ED PROVIDER NOTE - PHYSICAL EXAMINATION
gen: well appearing  Mentation: AAO x 3  psych: mood appropriate  ENT: airway patent  Eyes: conjunctivae clear bilaterally  Cardio: RRR, no m/r/g  Resp: normal BS b/l  GI: s/nt/nd  : no CVA tenderness  Neuro: sensation and motor function intact, CN 2-12 intact  Skin: No evidence of rash  MSK: normal movement of all extremities  Lymph/Vasc: no LE edema

## 2020-05-19 NOTE — ED BEHAVIORAL HEALTH ASSESSMENT NOTE - DESCRIPTION
Collateral obtained from pt's mother: pt kicked out of rehab in the [place] because she was using drugs. Came to house today. She is paranoid (worried about family being hurt), need to get locks changed. On and off for about 1 year. Cuts on wrist, long h/o cutting. Sometimes says doesn't want to be here. Using drugs since 2016. HTN, GERD, arthropathy, eczema, asthma, vit D Def, INNA; s/p Gastric sleeve, s/p  single, has 8 yr old son, living with parents calm, cooperative, in good behavioral control throughout ED course    Vital Signs Last 24 Hrs  T(C): 37.2 (19 May 2020 11:52), Max: 37.2 (19 May 2020 11:52)  T(F): 99 (19 May 2020 11:52), Max: 99 (19 May 2020 11:52)  HR: 82 (19 May 2020 13:25) (82 - 83)  BP: 124/70 (19 May 2020 13:25) (124/70 - 126/71)  BP(mean): --  RR: 14 (19 May 2020 13:25) (14 - 14)  SpO2: 97% (19 May 2020 13:25) (97% - 99%) see HPI

## 2020-05-19 NOTE — ED BEHAVIORAL HEALTH ASSESSMENT NOTE - SAFETY PLAN DETAILS
see summary reccs call 911 or return to ED if symptoms worsen or if pt develops thoughts of harming self or others

## 2020-05-19 NOTE — ED ADULT NURSE NOTE - OBJECTIVE STATEMENT
Receive pt in spot 12 alert and oriented x 3  with SI, depression and hallucinations.  Pt states " people are after her", and reported taking cocaine and crack yesterday and today.  Denies chest pain, sob, dizziness, cough, N/V/D.

## 2020-05-19 NOTE — ED BEHAVIORAL HEALTH ASSESSMENT NOTE - PAST PSYCHOTROPIC MEDICATION
per psyckes: ambien, xanax, librium, oxycodone, oxycodone-acetaminophen, lexapro, clonidine, trazodone, zoloft, buspar, amitriptyline per psyckes: ambien, xanax, librium, oxycodone, oxycodone-acetaminophen, lexapro, clonidine, zoloft, buspar, amitriptyline

## 2020-05-19 NOTE — ED PROVIDER NOTE - NSCAREINITIATED _GEN_ER
[facility] [address] [facility] Emphysema; MI, acute, non ST segment elevation ([facility] [address] [facility] and PVD (peripheral vascular disease) ([facility] [address] [place] [address] Ethan Gordon(Resident)

## 2020-05-19 NOTE — ED BEHAVIORAL HEALTH ASSESSMENT NOTE - RISK ASSESSMENT
RISK ASSESSMENT:   Patient has significant polysubstance dependence issues and NSSIB of cutting (?while intoxicated) which are contributory towards risks for suicide.  However, stable domicility along with full family support and a sense of responsibility towards her 8 yr old son, future orientation including concern from her family, Pt help seek and not manic or psychotic; no prior hx of SA nor family hx of SA all contribute towards mitigating suicide risk. The Pt denies SI/HI/I/P at this time or any time in her life.  At this time, she does not meet criteria for inpatient psychiatric hospitalization. Low Acute Suicide Risk RISK ASSESSMENT:   Risk factors: Patient has significant polysubstance dependence issues and NSSIB of cutting (?while intoxicated), currently undomiciled.  Protective factors include no suicide attempts, no violence history, no access to guns, supportive family, willingness to seek help, no suicidal ideation or homicidal ideation, future-oriented, identifies reasons for living.   Pt is not at acutely elevated risk of harm to self or others.

## 2020-05-19 NOTE — ED PROVIDER NOTE - ATTENDING CONTRIBUTION TO CARE
Agree with above, pt with passive SI also requesting detox for heroin use as she was kicked out of her methadone program. No infectious s/s, no concern for covid but will swab in case she requires psych inpt care, pt is NAD, non labored breathing, AAOx3, pending labs, psych consult.

## 2020-05-19 NOTE — ED PROVIDER NOTE - NSFOLLOWUPINSTRUCTIONS_ED_ALL_ED_FT
1. A copy of any of your resulted labs, imaging, and findings have been provided and discussed with you.   2. Follow up with your primary care doctor within 48 hours to assess the symptoms you were seen for in the emergency department and to review all results from your visit. If you don't have a doctor, call [phone] to make an appointment.  3. Follow up with outpatient services with resources provided.   4. Return immediately to the emergency department for new, persistent, or worsening symptoms or signs.

## 2020-05-19 NOTE — ED BEHAVIORAL HEALTH ASSESSMENT NOTE - REFERRAL / APPOINTMENT DETAILS
given community resources re: substance abuse programs SW provided pt with substance abuse treatment referrals; pt also provided with information for [facility]

## 2020-05-19 NOTE — ED BEHAVIORAL HEALTH ASSESSMENT NOTE - VIOLENCE RISK FACTORS:
Substance abuse/Noncompliance with treatment Substance abuse/Feeling of being under threat and being unable to control threat
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
## 2020-05-19 NOTE — ED PROVIDER NOTE - PATIENT PORTAL LINK FT
You can access the FollowMyHealth Patient Portal offered by [facility] by registering at the following website: [email or web address]. By joining [employer]’s FollowMyHealth portal, you will also be able to view your health information using other applications (apps) compatible with our system.

## 2020-05-20 NOTE — ED BEHAVIORAL HEALTH NOTE - BEHAVIORAL HEALTH NOTE
High Risk Log: Writer called patient at , pt's mother answered the phone stating pt's father took patient to [facility] where pt was admitted.

## 2020-05-21 DIAGNOSIS — Z71.89 OTHER SPECIFIED COUNSELING: ICD-10-CM

## 2021-07-20 NOTE — ED BEHAVIORAL HEALTH ASSESSMENT NOTE - NS ED BHA BILLING ATTENDING WO NP TRAINEE
Valuable Online Resource:    [facility] pregnancy essential guide    [email or web address]      On the right side of the screen is a 50 page guide providing valuable information about your entire pregnancy  On the left hand side of the site you will see several other links to great information and resources that Yolette Davila offers     If you click on the tab that says "Pregnancy and Birth Packet" this opens another 150 page guide to labor and delivery information as well as breast feeding information,  care, pediatricians, car seat safety and much more     The [facility] and [address] tab has a virtual tour of the new L&D unit, as well as valuable information about classes that are offered, breast feeding support, support groups and much more  Click around and enjoy all we have to offer! Please note that all information in regards to locations and visiting hours have not been updated due to COVID    We only deliver our babies at one location which is at [facility] [address], [facility] [address]          10% - bad control"> 10% - bad control,Hemoglobin A1c (HbA1c) greater than 10% indicating poor diabetic control,Haemoglobin A1c greater than 10% indicating poor diabetic control">   Diabetes and Pregnancy, Ambulatory Care   GENERAL INFORMATION:   What you need to know about diabetes and pregnancy:  Plan your pregnancy so healthcare providers can help you have a healthy pregnancy and baby  Decrease your risk of health problems by controlling your blood sugar levels before and during pregnancy  Seek immediate care for the following symptoms:   · Symptoms of hypoglycemia including being dizzy or shaking  You may sweat or have a headache  · Blood sugar level is over 200 mg/dL and you have stomach pain, nausea, vomiting, and confusion  Treatment for diabetes and pregnancy includes  getting prenatal care   Diabetes medicine or insulin may be needed to help control your blood sugar  Your healthcare provider may do a test called A1c at least every 3 months while you are pregnant  This blood test shows the average amount of sugar in your blood over the past 2 to 3 months  During labor and delivery, healthcare providers will check your blood sugar levels  They will give you insulin or glucose throughout your labor to keep your blood sugar at the right level  Manage diabetes and pregnancy:   · Eat a variety of healthy foods  The amount of calories you need depends on your weight before you got pregnant  Your healthcare provider or dietitian will tell you how many calories you need each day  You may need more calories while you are pregnant  You may need fewer calories if you are overweight  Your risk of problems such as high blood pressure and premature labor are higher if you are overweight  · Exercise  can help you keep your blood sugar level steady  Exercise can also help you lose weight if you are overweight  Ask your healthcare provider how much exercise you should get each day  Ask what types of physical activity you can do safely while you are pregnant  · Check your blood sugar level  Ask your healthcare providers when and how often you should check during the day  He will tell you what your blood sugar levels should be at different times throughout the day  He may want your blood sugar levels to be 60 mg/dL to 99 mg/dL before meals, at bedtime, and during the night  He may want them to be 100 mg/dL to 129 mg/dL after meals  Your healthcare provider can show you how to use a blood glucose meter to check your levels  · Take your diabetes medicine or insulin  as directed by your healthcare provider  If you have type 2 diabetes and you take diabetes pills, you may need to stop taking them and start using insulin  Insulin is safe to use during pregnancy  Certain medicines are not safe to use during pregnancy   Talk to your healthcare provider about all of the medicines you are currently taking  · Prevent hypoglycemia  Your risk of hypoglycemia (low blood sugar) is higher during pregnancy because you may not feel the symptoms  This risk is highest during the first trimester  Eat regular meals and snacks to avoid hypoglycemia  Always keep glucose tablets with you in case your blood sugar level gets low  If you do not have glucose tablets, drink milk, juice, or regular soda  Tell your family about the symptoms of hypoglycemia so they can help you if you cannot help yourself  Ask your healthcare provider how to manage hypoglycemia  · Prevent diabetic ketoacidosis (DKA)  DKA is a serious condition that can happen when your blood sugar level gets too high  Pregnancy increases your risk of DKA  The symptoms of DKA include stomach pain, nausea, vomiting, and confusion  Your healthcare provider may suggest that you test the levels of ketones in your urine when your blood sugar level is high  He may also ask you to check your ketones regularly if you are sick  · Do not drink alcohol  Alcohol is dangerous for your unborn baby  Alcohol can also increase your blood sugar levels and make your diabetes more difficult to manage  Ask your healthcare provider for information if you need help to quit drinking alcohol  · Do not smoke  If you smoke, it is never too late to quit  Smoking is harmful to your baby  Do not smoke around your baby, and do not let others smoke around him  Smoking can also worsen the problems that happen with diabetes  Ask your healthcare provider for information about quitting if you need help to stop smoking  Follow up with your healthcare provider as directed:  Write down your questions so you remember to ask them during your visits  CARE AGREEMENT:   You have the right to help plan your care  Learn about your health condition and how it may be treated   Discuss treatment options with your caregivers to decide what care you want to receive  You always have the right to refuse treatment  The above information is an  only  It is not intended as medical advice for individual conditions or treatments  Talk to your doctor, nurse or pharmacist before following any medical regimen to see if it is safe and effective for you  © 2014 [address] is for End User's use only and may not be sold, redistributed or otherwise used for commercial purposes  All illustrations and images included in CareNotes® are the copyrighted property of [employer]  or Peyman Robertson  Diabetes and Nutrition   WHAT YOU NEED TO KNOW:   Why are nutrition plans important? Nutrition plans help with healthy eating patterns that improve health  Nutrition plans and regular exercise help keep your blood sugar levels steady  They also help delay or prevent complications of diabetes, such as diabetic kidney disease  How do I create a nutrition plan? A dietitian will help you create a nutrition plan to meet your needs and your family's needs  The goal is for you to reach or maintain healthy weight, blood sugar, blood pressure, and lipid levels  You should meet with the dietitian at least 1 time each year  You will learn the following:  · How food affects your blood sugar levels    · How to create healthy eating habits    · How to make food choices based on your activity level, weight, and glucose levels    · How your favorite foods may fit into your plan    · Foods that contain carbohydrates (sugars and starches), including simple and complex carbohydrates    · How to keep track of all carbohydrates    · Correct portion sizes for each food    · Changes you can make to your plan if you get pregnant or are breastfeeding    What are some tips to do until I meet with the dietitian? · Do not skip meals  The goal is to keep your blood sugar level steady   Blood sugar levels may drop too low if you have received insulin and do not eat  · Eat more high-fiber foods, such as fresh or frozen fruits and vegetables, whole-grain breads, and beans  Fiber helps control or lower blood sugar and cholesterol levels  Choose whole fruits instead of fruit juice as much as possible  Sugar may be added to juice, and fiber may be removed  · Choose heart-healthy fats  Foods high in heart-healthy fats include olive oil, nuts, avocados, and fatty fish, such as salmon and tuna  Foods high in unhealthy fats include red meat, full-fat dairy products, and soft margarine  Unhealthy fats can increase your risk for heart disease, increase bad cholesterol, and lower good cholesterol  · Choose complex carbohydrates  Foods with complex carbohydrates include brown rice, whole-grain breads and cereals, and cooked beans  Foods with simple carbohydrates include white bread, white rice, most cold cereals, and snack foods  Your plan will include the amount of carbohydrate to have at one time or in a day  Your blood sugar level can get too high if you eat too much carbohydrate at one time  Blood sugar levels do not spike as high or drop as quickly with complex carbohydrates as with simple carbohydrates  Choose complex carbohydrates whenever possible  · Have less sodium (salt)  The risk for high blood pressure (BP) increases with high-sodium foods  Limit high-sodium foods, such as soy sauce, potato chips, and canned soup  Do not add salt to food you cook  Limit your use of table salt  Read labels to have no more than 2,300 milligrams of sodium in one day  · Limit artificial sweeteners  These may be found in food or drinks, such as diet soft drinks or other low-calorie beverages  Artificial sweeteners are low in calories  They may help you lower your overall calories and carbohydrates  It is important not to have more calories from other foods to make up for the calories saved  Artificial sweeteners do not have any nutrition  Eat whole foods and drink water as much as possible  Your plan may include beverages with artificial sweeteners for a short time  These can help you transition from high-sugar beverages to water  · Use the plate method for each meal   This method can help you eat the right amount of carbohydrates and keep your blood sugar levels under control  ? Draw an imaginary line down the middle of a 9-inch dinner plate  On one side, draw another line to divide that section in half  Your plate will have one large section and 2 small sections  ? Fill the largest section with non-starchy vegetables  These include broccoli, spinach, cucumbers, peppers, cauliflower, and tomatoes  ? Add a starch to one of the small sections  Starches include pasta, rice, whole-grain bread, tortillas, corn, potatoes, and beans  ? Add meat or another source of protein to the other small section  Examples include chicken or turkey without skin, fish, lean beef or pork, low-fat cheese, tofu, and eggs  ? Add dairy products or fruit next to your plate if your meal plan allows  Examples of dairy include skim or 1% milk and low-fat yogurt  If you do not drink milk or eat dairy products, you may be able to add another serving of starchy food instead  ? Have a low-calorie or calorie-free drink with your meal  Examples include water or unsweetened tea or coffee  What are the risks of alcohol? Alcohol can cause hypoglycemia (very low blood sugar level), especially if you use insulin  Alcohol can cause high blood sugar and BP levels, and weight gain if you drink too much  Women 21 years or older and men 72 years or older should limit alcohol to 1 drink a day  Men aged 24 to 59 years should limit alcohol to 2 drinks a day  A drink of alcohol is 12 ounces of beer, 5 ounces of wine, or 1½ ounces of liquor  Hypoglycemia can happen hours after you drink alcohol  Check your blood sugar level for several hours after you drink alcohol   Have a source of fast-acting carbohydrates with you in case your level goes too low  You need immediate care if you have signs or symptoms of hypoglycemia, such as sweating, confusion, or fainting  Why is it important to maintain a healthy weight? A healthy weight can help you control your diabetes  You can maintain a healthy weight with a nutrition plan and exercise  Ask your healthcare provider how much you should weigh  Ask him or her to help you create a weight loss plan if you are overweight  Together you can set weight loss and maintenance goals  Call your local emergency number [phone] in the [address] [facility]) if:   · You have any of the following signs of a heart attack:      ? Squeezing, pressure, or pain in your chest    ? You may  also have any of the following:     § Discomfort or pain in your back, neck, jaw, stomach, or arm    § Shortness of breath    § Nausea or vomiting    § Lightheadedness or a sudden cold sweat      When should I seek immediate care? · You have a low blood sugar level and it does not improve with treatment  Symptoms are trouble thinking, a pounding heartbeat, and sweating  · Your blood sugar level is above 240 mg/dL and does not come down within 15 minutes of treatment  · You have ketones in your blood or urine  · You have nausea or are vomiting and cannot keep any food or liquid down  · You have blurred or double vision  · Your breath has a fruity, sweet smell, or your breathing is shallow  When should I call my doctor or diabetes care team?   · Your blood sugar levels are higher than your target goals  · You often have low blood sugar levels  · You have trouble coping with diabetes, or you feel anxious or depressed  · You have questions or concerns about your condition or care  CARE AGREEMENT:   You have the right to help plan your care  Learn about your health condition and how it may be treated   Discuss treatment options with your healthcare providers to decide what care you want to receive  You always have the right to refuse treatment  The above information is an  only  It is not intended as medical advice for individual conditions or treatments  Talk to your doctor, nurse or pharmacist before following any medical regimen to see if it is safe and effective for you  © Copyright [employer] 2021 Information is for End User's use only and may not be sold, redistributed or otherwise used for commercial purposes   All illustrations and images included in CareNotes® are the copyrighted property of A D A M , Inc  or [address] [place] [id number]

## 2021-08-18 NOTE — ED PROVIDER NOTE - MEDICAL DECISION MAKING DETAILS
27F with pmhx of oxycodone abuse/withdrawal and current alcohol abuse who presents to ED with chief complaint of tremors, chest pain, palpitations since this AM. Hand tremor on exam, no tongue fasciculations. Appears comfortable, no distress. Vitals stable. Concern for early alcohol withdrawal. Patient desires out-pt detox. Will administer 50mg librium, check labs (Upreg, CBC, CMP, TSH, Utox, Btox, UA). EKG normal. Will continue to follow up and re-assess. Case discussed with Attending: Martin Tyson MD, PGY1
Detail Level: Detailed

## 2021-10-25 PROBLEM — Z00.00 ENCOUNTER FOR PREVENTIVE HEALTH EXAMINATION: Noted: 2021-10-25

## 2021-11-13 ENCOUNTER — APPOINTMENT (OUTPATIENT)
Dept: DISASTER EMERGENCY | Facility: OTHER | Age: 31
End: 2021-11-13
Payer: COMMERCIAL

## 2021-11-13 PROCEDURE — 0002A: CPT

## 2022-01-01 NOTE — ED BEHAVIORAL HEALTH ASSESSMENT NOTE - PERCEPTIONS

## 2022-01-21 ENCOUNTER — TRANSCRIPTION ENCOUNTER (OUTPATIENT)
Age: 32
End: 2022-01-21

## 2022-03-10 ENCOUNTER — APPOINTMENT (OUTPATIENT)
Dept: SURGERY | Facility: CLINIC | Age: 32
End: 2022-03-10
Payer: MEDICAID

## 2022-03-10 ENCOUNTER — APPOINTMENT (OUTPATIENT)
Dept: SURGERY | Facility: CLINIC | Age: 32
End: 2022-03-10

## 2022-03-10 VITALS
TEMPERATURE: 98.7 F | HEART RATE: 80 BPM | SYSTOLIC BLOOD PRESSURE: 115 MMHG | HEIGHT: 72 IN | WEIGHT: 230 LBS | RESPIRATION RATE: 16 BRPM | OXYGEN SATURATION: 99 % | DIASTOLIC BLOOD PRESSURE: 85 MMHG | BODY MASS INDEX: 31.15 KG/M2

## 2022-03-10 PROCEDURE — 99072 ADDL SUPL MATRL&STAF TM PHE: CPT

## 2022-03-10 PROCEDURE — 99213 OFFICE O/P EST LOW 20 MIN: CPT

## 2022-03-10 NOTE — ASSESSMENT
[FreeTextEntry1] : Years status post gastric sleeve with recent weight gain secondary to multiple factors.  Interested in getting back on track and weight loss medication if indicated

## 2022-03-10 NOTE — HISTORY OF PRESENT ILLNESS
[Procedure: ___] : Procedure performed: [unfilled]  [Date of Surgery: ___] : Date of Surgery:   [unfilled] [Surgeon Name:   ___] : Surgeon Name: Dr. KEANE [Pre-Op Weight ___] : Pre-op weight was [unfilled] lbs [___ Years Post Op] : [unfilled] years [de-identified] : Previous visit weight: 190\par Today's weight: 230\par Today's BMI: 31\par \par \par Pt would like to go on [place] but does not want to see Dr. Granados.  [de-identified] : DOLV: 11/26/2018 [de-identified] : Lost to follow up gained 40# since last visit\par Stopped smoking 3 months ago

## 2022-03-10 NOTE — PLAN
[FreeTextEntry1] : Decrease p.o. intake to 1000 nirmal a day\par 80 g of protein, less than 25 g of carbohydrates per day\par Increase activity\par Take vitamins\par Check labs\par Follow-up 2 months

## 2022-03-18 ENCOUNTER — APPOINTMENT (OUTPATIENT)
Dept: SURGERY | Facility: CLINIC | Age: 32
End: 2022-03-18
Payer: MEDICAID

## 2022-03-18 ENCOUNTER — APPOINTMENT (OUTPATIENT)
Dept: SURGERY | Facility: CLINIC | Age: 32
End: 2022-03-18

## 2022-03-18 VITALS
RESPIRATION RATE: 16 BRPM | HEART RATE: 74 BPM | OXYGEN SATURATION: 98 % | WEIGHT: 229.13 LBS | BODY MASS INDEX: 31.03 KG/M2 | SYSTOLIC BLOOD PRESSURE: 103 MMHG | HEIGHT: 72 IN | TEMPERATURE: 97.4 F | DIASTOLIC BLOOD PRESSURE: 65 MMHG

## 2022-03-18 DIAGNOSIS — Z71.3 DIETARY COUNSELING AND SURVEILLANCE: ICD-10-CM

## 2022-03-18 PROCEDURE — 99213 OFFICE O/P EST LOW 20 MIN: CPT

## 2022-03-18 PROCEDURE — 99072 ADDL SUPL MATRL&STAF TM PHE: CPT

## 2022-03-18 NOTE — REASON FOR VISIT
[Follow-Up Visit] : a follow-up visit for [S/P Bariatric Surgery] : s/p bariatric surgery [Other___] : [unfilled]

## 2022-03-18 NOTE — ASSESSMENT
[FreeTextEntry1] : Pt is a 32 year old F, whose current wt is 229#, which is above IBW range and BMI is indicative of obese wt status (BMI: 31.1). Pt s/p sleeve gastrectomy on 9/15/2015 performed by Dr. Lorenzana. Pt presents with 40# wt gain since last visit with surgeon. \par \par Breakfast: oatmeal, or protein shakes, or yogurt drink\par Lunch: protein shake, or granola bar, or hanna (hot sauce), or 1/2 sandwich from [employer], bag of chips\par Dinner: tossed salad with chicken, or pizza, or cauliflower rice, eggs \par Snack: chips, dessert, fruit, cereal\par Fluids: iced tea (sweetened), seltzer water, ginger ale, coffee (creamer, no sugar), tea (honey)\par \par Pt reports she has tried the 2 week liquid diet, the pouch reset, and commercial diets as a way to lose weight. Pt is requesting a weight loss medication to help suppress her appetite. Discussed alternatives to weight loss medication and pt amenable to trying to lose wt with dietary changes and exercise.\par \par Discussed the importance of a balanced, healthy diet of nourishing foods and consistent meal pattern for long-term wt loss success and health maintenance. Pt provided with information on purchasing appropriate meal replacement products and to focus on high quality protein/carbohydrate sources. Encouraged patient to eat slowly, listen to hunger and satiety cues, plan consistent meals with a protein source for hunger regulation, and to make food choices based on what will best nourish her body. Detailed information was provided on portion control, nutrient and fluid needs, and mindful eating. Suggested alternatives to grazing throughout the day to redirect boredom eating. Pt provided with resources to facilitate self-care habits, such as food logging and physical activity. Pt educated on cutting out caloric beverages sabotaging wt loss as caloric fluids provide little satiation and empty calories. Pt educated on consuming 64oz of water per day to facilitate adequate hydration. Pt verbalized understanding and expressed gratitude.\par \par NUTRITION GOALS:  \par -Eliminate caloric beverages and increase water intake to facilitate adequate hydration\par -Consume consistent protein containing meals  \par -Work to improve quality of food choice to maximize nourishment\par -Limit snacks outside of physical hunger  / no more grazing\par -Increase physical activity to 150 mins per week\par -No eating after 7pm\par -Remove "junk" food from the house\par \par Pt to follow up in 6-8 weeks. RD to remain available for ongoing support and encouragement.

## 2022-03-18 NOTE — HISTORY OF PRESENT ILLNESS
[de-identified] : Ms. NAHUM DEL RIO is a 32 year old with history of morbid obesity, s/p bariatric surgery with Dr. Lorenzana. Here today for nutritional counseling, weight gain. Feels well, denies pain, fever, chills, nausea or vomiting.\par

## 2022-03-28 LAB
25(OH)D3 SERPL-MCNC: 19.2 NG/ML
ALBUMIN SERPL ELPH-MCNC: 3.8 G/DL
ALP BLD-CCNC: 71 U/L
ALT SERPL-CCNC: 9 U/L
ANION GAP SERPL CALC-SCNC: 10 MMOL/L
AST SERPL-CCNC: 16 U/L
BASOPHILS # BLD AUTO: 0.02 K/UL
BASOPHILS NFR BLD AUTO: 0.4 %
BILIRUB SERPL-MCNC: 0.3 MG/DL
BUN SERPL-MCNC: 13 MG/DL
CALCIUM SERPL-MCNC: 9 MG/DL
CHLORIDE SERPL-SCNC: 102 MMOL/L
CO2 SERPL-SCNC: 27 MMOL/L
CREAT SERPL-MCNC: 0.88 MG/DL
EGFR: 89 ML/MIN/1.73M2
EOSINOPHIL # BLD AUTO: 0.17 K/UL
EOSINOPHIL NFR BLD AUTO: 3.1 %
ESTIMATED AVERAGE GLUCOSE: 103 MG/DL
FOLATE SERPL-MCNC: 18.8 NG/ML
GLUCOSE SERPL-MCNC: 87 MG/DL
HBA1C MFR BLD HPLC: 5.2 %
HCT VFR BLD CALC: 35.3 %
HGB BLD-MCNC: 11.5 G/DL
IMM GRANULOCYTES NFR BLD AUTO: 0.4 %
IRON SERPL-MCNC: 101 UG/DL
LYMPHOCYTES # BLD AUTO: 1.76 K/UL
LYMPHOCYTES NFR BLD AUTO: 31.8 %
MAN DIFF?: NORMAL
MCHC RBC-ENTMCNC: 31.4 PG
MCHC RBC-ENTMCNC: 32.6 GM/DL
MCV RBC AUTO: 96.4 FL
MONOCYTES # BLD AUTO: 0.45 K/UL
MONOCYTES NFR BLD AUTO: 8.1 %
NEUTROPHILS # BLD AUTO: 3.11 K/UL
NEUTROPHILS NFR BLD AUTO: 56.2 %
PLATELET # BLD AUTO: 339 K/UL
POTASSIUM SERPL-SCNC: 4.9 MMOL/L
PROT SERPL-MCNC: 6.9 G/DL
RBC # BLD: 3.66 M/UL
RBC # FLD: 12.4 %
SODIUM SERPL-SCNC: 138 MMOL/L
VIT B12 SERPL-MCNC: 370 PG/ML
WBC # FLD AUTO: 5.53 K/UL

## 2022-04-01 LAB — VIT B1 SERPL-MCNC: 124.3 NMOL/L

## 2022-04-13 NOTE — HISTORY OF PRESENT ILLNESS
[Procedure: ___] : Procedure performed: [unfilled]  [Date of Surgery: ___] : Date of Surgery:   [unfilled] [Surgeon Name:   ___] : Surgeon Name: Dr. KEANE [Pre-Op Weight ___] : Pre-op weight was [unfilled] lbs [___ Years Post Op] : [unfilled] years [de-identified] : Recent lab work shows low Vitamin B12 & Vitamin D; low RBCs, iron & folate normal  [Phase 4] : Phase 4 [Diabetes] : no Diabetes [Hypertension] : no Hypertension [Sleep Apnea] : no Sleep Apnea [Hyperlipidemia] : no Hyperlipidemia

## 2022-04-14 ENCOUNTER — APPOINTMENT (OUTPATIENT)
Dept: SURGERY | Facility: CLINIC | Age: 32
End: 2022-04-14

## 2022-06-15 NOTE — HISTORY OF PRESENT ILLNESS
[Procedure: ___] : Procedure performed: [unfilled]  [Date of Surgery: ___] : Date of Surgery:   [unfilled] [Surgeon Name:   ___] : Surgeon Name: Dr. KEANE [Pre-Op Weight ___] : Pre-op weight was [unfilled] lbs [___ Years Post Op] : [unfilled] years [de-identified] : Saw bariatric RD for nutritional evaluation; will need B12 shot today and Rx for Vitamin D [Diabetes] : no Diabetes [Hypertension] : no Hypertension [Sleep Apnea] : no Sleep Apnea [Hyperlipidemia] : no Hyperlipidemia

## 2022-06-16 ENCOUNTER — APPOINTMENT (OUTPATIENT)
Dept: SURGERY | Facility: CLINIC | Age: 32
End: 2022-06-16

## 2022-06-26 ENCOUNTER — EMERGENCY (EMERGENCY)
Facility: HOSPITAL | Age: 32
LOS: 1 days | Discharge: HOME | End: 2022-06-26
Attending: EMERGENCY MEDICINE
Payer: COMMERCIAL

## 2022-06-26 VITALS
RESPIRATION RATE: 18 BRPM | DIASTOLIC BLOOD PRESSURE: 55 MMHG | SYSTOLIC BLOOD PRESSURE: 100 MMHG | HEART RATE: 90 BPM | OXYGEN SATURATION: 99 %

## 2022-06-26 VITALS
HEART RATE: 108 BPM | RESPIRATION RATE: 20 BRPM | WEIGHT: 225.09 LBS | DIASTOLIC BLOOD PRESSURE: 67 MMHG | TEMPERATURE: 98 F | SYSTOLIC BLOOD PRESSURE: 114 MMHG | HEIGHT: 72 IN | OXYGEN SATURATION: 96 %

## 2022-06-26 DIAGNOSIS — Z98.84 BARIATRIC SURGERY STATUS: Chronic | ICD-10-CM

## 2022-06-26 DIAGNOSIS — Z98.89 OTHER SPECIFIED POSTPROCEDURAL STATES: Chronic | ICD-10-CM

## 2022-06-26 LAB
APPEARANCE UR: CLEAR — SIGNIFICANT CHANGE UP
BACTERIA # UR AUTO: NEGATIVE — SIGNIFICANT CHANGE UP
BILIRUB UR-MCNC: NEGATIVE — SIGNIFICANT CHANGE UP
COLOR SPEC: COLORLESS — SIGNIFICANT CHANGE UP
DIFF PNL FLD: ABNORMAL
EPI CELLS # UR: 1 /HPF — SIGNIFICANT CHANGE UP
GLUCOSE UR QL: NEGATIVE — SIGNIFICANT CHANGE UP
HYALINE CASTS # UR AUTO: 0 /LPF — SIGNIFICANT CHANGE UP (ref 0–2)
KETONES UR-MCNC: NEGATIVE — SIGNIFICANT CHANGE UP
LEUKOCYTE ESTERASE UR-ACNC: NEGATIVE — SIGNIFICANT CHANGE UP
NITRITE UR-MCNC: NEGATIVE — SIGNIFICANT CHANGE UP
PH UR: 6 — SIGNIFICANT CHANGE UP (ref 5–8)
PROT UR-MCNC: NEGATIVE — SIGNIFICANT CHANGE UP
RBC CASTS # UR COMP ASSIST: 1 /HPF — SIGNIFICANT CHANGE UP (ref 0–4)
SP GR SPEC: 1 — LOW (ref 1.01–1.02)
UROBILINOGEN FLD QL: NEGATIVE — SIGNIFICANT CHANGE UP
WBC UR QL: 0 /HPF — SIGNIFICANT CHANGE UP (ref 0–5)

## 2022-06-26 PROCEDURE — 93005 ELECTROCARDIOGRAM TRACING: CPT

## 2022-06-26 PROCEDURE — 99284 EMERGENCY DEPT VISIT MOD MDM: CPT

## 2022-06-26 PROCEDURE — 81001 URINALYSIS AUTO W/SCOPE: CPT

## 2022-06-26 PROCEDURE — 93010 ELECTROCARDIOGRAM REPORT: CPT

## 2022-06-26 NOTE — ED PROVIDER NOTE - NSFOLLOWUPCLINICS_GEN_ALL_ED_FT
[facility]  Cardiology  [address] [place] [id number]  Phone: [phone]  Fax:

## 2022-06-26 NOTE — ED PROVIDER NOTE - PHYSICAL EXAMINATION
CONSTITUTIONAL: well-appearing, in NAD  SKIN: Warm dry, normal skin turgor  HEAD: NCAT  EYES: EOMI, PERRLA, no scleral icterus, conjunctiva pink  ENT: normal pharynx with no erythema or exudates  NECK: Supple; non tender. Full ROM.  CARD: RRR, no murmurs.  RESP: clear to ausculation b/l. No crackles or wheezing.  ABD: soft, non-tender, non-distended, no rebound or guarding.  MSK: Full ROM, no bony tenderness, no pedal edema, no calf tenderness  NEURO: normal motor. normal sensory. normal gait.  PSYCH: Cooperative, appropriate.

## 2022-06-26 NOTE — ED PROVIDER NOTE - PATIENT PORTAL LINK FT
You can access the FollowMyHealth Patient Portal offered by [facility] by registering at the following website: [email or web address]. By joining [facility]’s FollowMyHealth portal, you will also be able to view your health information using other applications (apps) compatible with our system.

## 2022-06-26 NOTE — ED PROVIDER NOTE - NSFOLLOWUPINSTRUCTIONS_ED_ALL_ED_FT
You were seen in the Emergency Department for palpitations.    Follow up with your primary care provider within 3-5 days.    If you have fever, chills, nausea, vomiting, new or worsening pain, or if you have any new symptoms return to the Emergency Department. You were seen in the Emergency Department for palpitations.    Follow up with your primary care provider within 3-5 days. I have also attached information for a cardiologist as we discussed.     If you have fever, chills, nausea, vomiting, new or worsening pain, or if you have any new symptoms return to the Emergency Department.    A palpitation is the feeling that your heartbeat is irregular or is faster than normal. It may feel like your heart is fluttering or skipping a beat. They may be caused by many things, including smoking, caffeine, alcohol, stress, and certain medicines. Although most causes of palpitations are not serious, palpitations can be a sign of a serious medical problem. Avoid caffeine, alcohol, and tobacco products at home. Try to reduce stress and anxiety and make sure to get plenty of rest.     SEEK IMMEDIATE MEDICAL CARE IF YOU HAVE ANY OF THE FOLLOWING SYMPTOMS: chest pain, shortness of breath, severe headache, dizziness/lightheadedness, or fainting.

## 2022-06-26 NOTE — ED ADULT NURSE NOTE - ED STAT RN HANDOFF DETAILS
Report given to POLA Parker and Sp . Aware of POC. No questions at this time. Report given to RN Pat. Aware of POC. No questions at this time.

## 2022-06-26 NOTE — ED ADULT TRIAGE NOTE - CHIEF COMPLAINT QUOTE
As per EMS, pt took "800 mg of Seroquel" and took a long walk and started feeling faint and heart palpitations. Pt denies taking medications to harm self.

## 2022-06-26 NOTE — ED ADULT NURSE NOTE - OBJECTIVE STATEMENT
32 y F presents to the ED with EMS, pt took "800 mg of Seroquel" and took a long walk in a "sauna suit" and started feeling faint and heart palpitations. Pt denies taking medications to harm self. Reports that she was just trying get some sleep. Reports that as soon as she double her med, she began to feel this way, "that it has nothing to do with the walking in the sauna suit." Pt reports that she feels much better than she did in her house, and she thinks "she just needed some O2". On assessment, AOx4. PERRL 3 mm. HORNE. no facial droop, slurred speech, and arm drift. strength equal in all extremities. Sensation normal in all extremities. Denies dizziness, headaches, numbness and tingling. Breathing spontaneously and unlabored on Room air. Denies cough, SOB and CP. No Peripheral edema. Cap refill 2s. No JVD. Peripheral pulses strong and equal bilaterally. On cardiac monitor. Denies CP, SOB. Abdomen soft, nontender, nondistended. Pt is continent. Denies n/v/d, dysuria, melena and hematuria. RN unable to obtain IV access. MD Bravo made aware. Pt safety maintained. Call bell within reach. Side rails in upward position. Pt safety maintained. Call bell within reach. Side rails in upward position.

## 2022-06-26 NOTE — ED PROVIDER NOTE - PROGRESS NOTE DETAILS
Bg Bravo, DO PGY-2: Pt refusing labs, will send ua nd hcg urine Bg Bravo, DO PGY-2: Pt refusing labs,  feels much better ,wants to go home will send ua nd hcg urine Garret, PGY-2  Ucg negative, patient HR currently in 80s with no palpitations, hemodynamically stable; will d/c with return precautions and cards f/u

## 2022-06-26 NOTE — ED PROVIDER NOTE - CLINICAL SUMMARY MEDICAL DECISION MAKING FREE TEXT BOX
Concern for seroquel od although w/ only 2 doses not very likely, palpitations likely 2/2 exercise w/ plastic suit + Phentermine use, will get labs, observe, check hcg, likely dc home afterwards, no concern for acute intraabdominal pathology, no concern for acs or pe at this time Concern for seroquel od although w/ only 2 doses not very likely, palpitations likely 2/2 exercise w/ plastic suit + Phentermine use, will get labs, observe, check hcg, likely dc home afterwards, no concern for acute intraabdominal pathology, no concern for acs or pe at this time ZR

## 2022-06-26 NOTE — ED PROVIDER NOTE - OBJECTIVE STATEMENT
32F PMH of asthma, bipolar disorder, manic depression, anxiety presents to ED after taking double dose of seroquel (400mg x2), exercising in impermeable plastic suit, pt also taking Phentermine for weight loss, pt states she is now feeling better, refusing labs, wants to go home. Pt states she never took double seroquel before and took because she wanted to sleep later.

## 2022-07-17 ENCOUNTER — NON-APPOINTMENT (OUTPATIENT)
Age: 32
End: 2022-07-17

## 2022-08-22 ENCOUNTER — APPOINTMENT (OUTPATIENT)
Dept: BARIATRICS/WEIGHT MGMT | Facility: CLINIC | Age: 32
End: 2022-08-22

## 2022-10-07 NOTE — ED PROVIDER NOTE - CROS ED EYES ALL NEG
Problem: Chronic Conditions and Co-morbidities  Goal: Patient's chronic conditions and co-morbidity symptoms are monitored and maintained or improved  Outcome: Adequate for Discharge     Problem: Safety - Adult  Goal: Free from fall injury  Outcome: Adequate for Discharge
negative...

## 2022-11-17 ENCOUNTER — EMERGENCY (EMERGENCY)
Facility: HOSPITAL | Age: 32
LOS: 1 days | Discharge: HOME | End: 2022-11-17
Attending: STUDENT IN AN ORGANIZED HEALTH CARE EDUCATION/TRAINING PROGRAM
Payer: COMMERCIAL

## 2022-11-17 VITALS
SYSTOLIC BLOOD PRESSURE: 102 MMHG | DIASTOLIC BLOOD PRESSURE: 66 MMHG | HEART RATE: 75 BPM | RESPIRATION RATE: 18 BRPM | OXYGEN SATURATION: 99 % | WEIGHT: 210.1 LBS | TEMPERATURE: 98 F

## 2022-11-17 VITALS
DIASTOLIC BLOOD PRESSURE: 68 MMHG | HEART RATE: 73 BPM | OXYGEN SATURATION: 98 % | SYSTOLIC BLOOD PRESSURE: 107 MMHG | RESPIRATION RATE: 18 BRPM | TEMPERATURE: 98 F

## 2022-11-17 DIAGNOSIS — Z98.89 OTHER SPECIFIED POSTPROCEDURAL STATES: Chronic | ICD-10-CM

## 2022-11-17 DIAGNOSIS — Z98.84 BARIATRIC SURGERY STATUS: Chronic | ICD-10-CM

## 2022-11-17 LAB
ALBUMIN SERPL ELPH-MCNC: 4 G/DL — SIGNIFICANT CHANGE UP (ref 3.3–5)
ALP SERPL-CCNC: 54 U/L — SIGNIFICANT CHANGE UP (ref 40–120)
ALT FLD-CCNC: 11 U/L — SIGNIFICANT CHANGE UP (ref 10–45)
ANION GAP SERPL CALC-SCNC: 7 MMOL/L — SIGNIFICANT CHANGE UP (ref 5–17)
ANION GAP SERPL CALC-SCNC: 8 MMOL/L — SIGNIFICANT CHANGE UP (ref 5–17)
APPEARANCE UR: CLEAR — SIGNIFICANT CHANGE UP
AST SERPL-CCNC: 25 U/L — SIGNIFICANT CHANGE UP (ref 10–40)
BASOPHILS # BLD AUTO: 0.04 K/UL — SIGNIFICANT CHANGE UP (ref 0–0.2)
BASOPHILS NFR BLD AUTO: 0.6 % — SIGNIFICANT CHANGE UP (ref 0–2)
BILIRUB SERPL-MCNC: 0.4 MG/DL — SIGNIFICANT CHANGE UP (ref 0.2–1.2)
BILIRUB UR-MCNC: ABNORMAL
BUN SERPL-MCNC: 11 MG/DL — SIGNIFICANT CHANGE UP (ref 7–23)
BUN SERPL-MCNC: 11 MG/DL — SIGNIFICANT CHANGE UP (ref 7–23)
CALCIUM SERPL-MCNC: 9.4 MG/DL — SIGNIFICANT CHANGE UP (ref 8.4–10.5)
CALCIUM SERPL-MCNC: 9.6 MG/DL — SIGNIFICANT CHANGE UP (ref 8.4–10.5)
CHLORIDE SERPL-SCNC: 101 MMOL/L — SIGNIFICANT CHANGE UP (ref 96–108)
CHLORIDE SERPL-SCNC: 102 MMOL/L — SIGNIFICANT CHANGE UP (ref 96–108)
CO2 SERPL-SCNC: 28 MMOL/L — SIGNIFICANT CHANGE UP (ref 22–31)
CO2 SERPL-SCNC: 30 MMOL/L — SIGNIFICANT CHANGE UP (ref 22–31)
COLOR SPEC: YELLOW — SIGNIFICANT CHANGE UP
CREAT SERPL-MCNC: 0.88 MG/DL — SIGNIFICANT CHANGE UP (ref 0.5–1.3)
CREAT SERPL-MCNC: 0.94 MG/DL — SIGNIFICANT CHANGE UP (ref 0.5–1.3)
DIFF PNL FLD: NEGATIVE — SIGNIFICANT CHANGE UP
EGFR: 82 ML/MIN/1.73M2 — SIGNIFICANT CHANGE UP
EGFR: 88 ML/MIN/1.73M2 — SIGNIFICANT CHANGE UP
EOSINOPHIL # BLD AUTO: 0.2 K/UL — SIGNIFICANT CHANGE UP (ref 0–0.5)
EOSINOPHIL NFR BLD AUTO: 2.9 % — SIGNIFICANT CHANGE UP (ref 0–6)
FLUAV AG NPH QL: SIGNIFICANT CHANGE UP
FLUBV AG NPH QL: SIGNIFICANT CHANGE UP
GLUCOSE SERPL-MCNC: 86 MG/DL — SIGNIFICANT CHANGE UP (ref 70–99)
GLUCOSE SERPL-MCNC: 90 MG/DL — SIGNIFICANT CHANGE UP (ref 70–99)
GLUCOSE UR QL: NEGATIVE — SIGNIFICANT CHANGE UP
HCG SERPL-ACNC: <2 MIU/ML — SIGNIFICANT CHANGE UP
HCT VFR BLD CALC: 36.2 % — SIGNIFICANT CHANGE UP (ref 34.5–45)
HGB BLD-MCNC: 11.9 G/DL — SIGNIFICANT CHANGE UP (ref 11.5–15.5)
IMM GRANULOCYTES NFR BLD AUTO: 0.3 % — SIGNIFICANT CHANGE UP (ref 0–0.9)
KETONES UR-MCNC: NEGATIVE — SIGNIFICANT CHANGE UP
LEUKOCYTE ESTERASE UR-ACNC: NEGATIVE — SIGNIFICANT CHANGE UP
LIDOCAIN IGE QN: 14 U/L — SIGNIFICANT CHANGE UP (ref 7–60)
LYMPHOCYTES # BLD AUTO: 2.41 K/UL — SIGNIFICANT CHANGE UP (ref 1–3.3)
LYMPHOCYTES # BLD AUTO: 34.5 % — SIGNIFICANT CHANGE UP (ref 13–44)
MCHC RBC-ENTMCNC: 31.7 PG — SIGNIFICANT CHANGE UP (ref 27–34)
MCHC RBC-ENTMCNC: 32.9 GM/DL — SIGNIFICANT CHANGE UP (ref 32–36)
MCV RBC AUTO: 96.5 FL — SIGNIFICANT CHANGE UP (ref 80–100)
MONOCYTES # BLD AUTO: 0.56 K/UL — SIGNIFICANT CHANGE UP (ref 0–0.9)
MONOCYTES NFR BLD AUTO: 8 % — SIGNIFICANT CHANGE UP (ref 2–14)
NEUTROPHILS # BLD AUTO: 3.75 K/UL — SIGNIFICANT CHANGE UP (ref 1.8–7.4)
NEUTROPHILS NFR BLD AUTO: 53.7 % — SIGNIFICANT CHANGE UP (ref 43–77)
NITRITE UR-MCNC: NEGATIVE — SIGNIFICANT CHANGE UP
NRBC # BLD: 0 /100 WBCS — SIGNIFICANT CHANGE UP (ref 0–0)
PH UR: 6 — SIGNIFICANT CHANGE UP (ref 5–8)
PLATELET # BLD AUTO: 218 K/UL — SIGNIFICANT CHANGE UP (ref 150–400)
POTASSIUM SERPL-MCNC: 3.8 MMOL/L — SIGNIFICANT CHANGE UP (ref 3.5–5.3)
POTASSIUM SERPL-MCNC: 5.6 MMOL/L — HIGH (ref 3.5–5.3)
POTASSIUM SERPL-SCNC: 3.8 MMOL/L — SIGNIFICANT CHANGE UP (ref 3.5–5.3)
POTASSIUM SERPL-SCNC: 5.6 MMOL/L — HIGH (ref 3.5–5.3)
PROT SERPL-MCNC: 7.7 G/DL — SIGNIFICANT CHANGE UP (ref 6–8.3)
PROT UR-MCNC: SIGNIFICANT CHANGE UP
RBC # BLD: 3.75 M/UL — LOW (ref 3.8–5.2)
RBC # FLD: 12.3 % — SIGNIFICANT CHANGE UP (ref 10.3–14.5)
RSV RNA NPH QL NAA+NON-PROBE: SIGNIFICANT CHANGE UP
SARS-COV-2 RNA SPEC QL NAA+PROBE: SIGNIFICANT CHANGE UP
SODIUM SERPL-SCNC: 137 MMOL/L — SIGNIFICANT CHANGE UP (ref 135–145)
SODIUM SERPL-SCNC: 139 MMOL/L — SIGNIFICANT CHANGE UP (ref 135–145)
SP GR SPEC: 1.03 — HIGH (ref 1.01–1.02)
UROBILINOGEN FLD QL: ABNORMAL
WBC # BLD: 6.98 K/UL — SIGNIFICANT CHANGE UP (ref 3.8–10.5)
WBC # FLD AUTO: 6.98 K/UL — SIGNIFICANT CHANGE UP (ref 3.8–10.5)

## 2022-11-17 PROCEDURE — 80048 BASIC METABOLIC PNL TOTAL CA: CPT

## 2022-11-17 PROCEDURE — 83690 ASSAY OF LIPASE: CPT

## 2022-11-17 PROCEDURE — 87637 SARSCOV2&INF A&B&RSV AMP PRB: CPT

## 2022-11-17 PROCEDURE — 85025 COMPLETE CBC W/AUTO DIFF WBC: CPT

## 2022-11-17 PROCEDURE — 99284 EMERGENCY DEPT VISIT MOD MDM: CPT

## 2022-11-17 PROCEDURE — 81003 URINALYSIS AUTO W/O SCOPE: CPT

## 2022-11-17 PROCEDURE — 36415 COLL VENOUS BLD VENIPUNCTURE: CPT

## 2022-11-17 PROCEDURE — 84702 CHORIONIC GONADOTROPIN TEST: CPT

## 2022-11-17 PROCEDURE — 80053 COMPREHEN METABOLIC PANEL: CPT

## 2022-11-17 RX ORDER — POLYETHYLENE GLYCOL 3350 17 G/17G
17 POWDER, FOR SOLUTION ORAL
Qty: 510 | Refills: 0
Start: 2022-11-17 | End: 2022-12-16

## 2022-11-17 RX ORDER — MINERAL OIL
133 OIL (ML) MISCELLANEOUS ONCE
Refills: 0 | Status: COMPLETED | OUTPATIENT
Start: 2022-11-17 | End: 2022-11-17

## 2022-11-17 RX ORDER — SENNA PLUS 8.6 MG/1
2 TABLET ORAL
Qty: 56 | Refills: 0
Start: 2022-11-17 | End: 2022-11-30

## 2022-11-17 RX ADMIN — Medication 133 MILLILITER(S): at 18:44

## 2022-11-17 NOTE — ED PROVIDER NOTE - PHYSICAL EXAMINATION
Physical Exam:  General: NAD, Conversive  Eyes: EOMI, Conjunctiva and sclera clear  Neck: No JVD  Lungs: Clear to auscultation bilaterally, no wheeze, no rhonchi  Heart: Normal S1, S2, no murmurs  Abdomen: Soft, mild diffuse tenderness, no CVA tenderness  Extremities: 2+ peripheral pulses, no edema  Psych: AAO X3  Neurologic: Non-focal

## 2022-11-17 NOTE — ED ADULT NURSE REASSESSMENT NOTE - NS ED NURSE REASSESS COMMENT FT1
Pt provided with crackers and apple juice, PO challenged passed. Pt states she is feeling better and was able to make BM, pt would like to go home. MD aware, Pt awaits dispo.

## 2022-11-17 NOTE — ED PROVIDER NOTE - ATTENDING CONTRIBUTION TO CARE
32 F w/ hx of gastric sleeve in 2015 follows w/ Dr. King, c section 2011,pt on chronic methadone 50 mg daily last dose between 8-930 AM, pt supposed to be on high fiber diet but has trouble keeping it, pt w/ intermittent constipation states for the past 10 days hasn't pooped, pt states she has trouble tolerating mg citrate due to sleeve, pt ww/ no f/c/n/v. she states she feels distended. pt states took senna today w/ [facility] suppository, (bisacodyl) today w/ ducolax and had minimal relief. Pt in the ER today due to worsening symptoms. On exam pt is awake and alert in no distress has clear lungs soft abdomen, has 5/5 upper and lower extremity strength, pt w/ intact sensation in the bilateral arms/legs, plan for labs ct imaging and reassessment. dispo pending results.

## 2022-11-17 NOTE — ED PROVIDER NOTE - PROGRESS NOTE DETAILS
BM improved pain, discussed with bariatric in agreement no need for CT imaging, awaiting repeat potassium, DC with improved bowel regimen. pt tolerated PO, pt w/ improvement in K no abd pain

## 2022-11-17 NOTE — ED PROVIDER NOTE - PATIENT PORTAL LINK FT
You can access the FollowMyHealth Patient Portal offered by [facility] by registering at the following website: [email or web address]. By joining [employer]’s FollowMyHealth portal, you will also be able to view your health information using other applications (apps) compatible with our system.

## 2022-11-17 NOTE — CONSULT NOTE ADULT - ATTENDING COMMENTS
Unable to see patient before discharged from ED.  Plan discussed with fellow- s/p sleeve in 2015, with abdominal discomfort in the setting of no bowel movement in 10days.  Relief after enema and large BM.  Will follow up with Dr. Lorenzana as outpatient, continue bowel regimen.    Lo Romero MD

## 2022-11-17 NOTE — ED PROVIDER NOTE - RAPID ASSESSMENT
Pt w/ PSHx of gastric sleeve surgery presents to Ed  c/o of constipation for 2wks a/w abd pain and last bowel movement 8 days ago. Denies vomiting.   Patient was rapidly assessed via a telemedicine and/or role of Quick Triage Doctor; a limited history, physical exam and assessment was performed. The patient will be seen and further evaluated in the main emergency department. The remainder of care and evaluation will be conducted by the primary emergency medicine team. Receiving team will follow up on labs, imaging and serially reassess patient as indicated. All further decisions regarding patient care, evaluation and disposition are at the discretion of the receiving primary emergency department team. Seen by Kateryna Cardenas (PA) and accompanied by Kimmy Cruz (Caleb) Pt w/ PSHx of gastric sleeve surgery presents to Ed  c/o of constipation for 2wks a/w abd pain and last bowel movement 8 days ago. Denies vomiting. Pt states she is on Methadone and thinks this may be causing her constipation    Patient was rapidly assessed via a telemedicine and/or role of Quick Triage Doctor; a limited history, physical exam and assessment was performed. The patient will be seen and further evaluated in the main emergency department. The remainder of care and evaluation will be conducted by the primary emergency medicine team. Receiving team will follow up on labs, imaging and serially reassess patient as indicated. All further decisions regarding patient care, evaluation and disposition are at the discretion of the receiving primary emergency department team. Seen by Kateryna Cardenas (PA) and accompanied by Kimmy Cruz (Caleb)    Rapid assessment by Kateryna Cardenas PA-C full eval to be performed in ED. Above documentation completed by scribe above. I was present for and agree with documentation.   Kateryna Cardenas PA-C

## 2022-11-17 NOTE — CONSULT NOTE ADULT - ASSESSMENT
32F hx laparoscopic gastric sleeve in 2015 w/ Dr. Lorenzana, c/s 2011, on chronic methadone p/w abdominal pain and constipation for last 10 days. In ED, pt. had an enema with large BM, symptoms since resolved.    RECOMMENDATIONS:  - PO challenge  - Dispo per ED    To be discussed with fellow on behalf of attending.    MONICA Lora, PGY-2  Green Surgery  [id number]  32F hx laparoscopic gastric sleeve in 2015 w/ Dr. Lorenzana, c/s 2011, on chronic methadone p/w abdominal pain and constipation for last 10 days. In ED, pt. had an enema with large BM, symptoms since resolved.    RECOMMENDATIONS:  - PO challenge  - Dispo per ED    Discussed with fellow on behalf of attending.    MONICA Lora, PGY-2  Green Surgery  [id number]  32F hx laparoscopic gastric sleeve in 2015 w/ Dr. Lorenzana, c/s 2011, on chronic methadone p/w abdominal pain and constipation for last 10 days. In ED, pt. had an enema with large BM, symptoms since resolved.    RECOMMENDATIONS:  - PO challenge  - IVF  - Dispo per ED    Discussed with fellow on behalf of attending.    MONICA Lora, PGY-2  Green Surgery  [id number]

## 2022-11-17 NOTE — ED PROVIDER NOTE - NSFOLLOWUPINSTRUCTIONS_ED_ALL_ED_FT
Constipation is when a person has fewer than three bowel movements a week, has difficulty having a bowel movement, or has stools that are dry, hard, or larger than normal. As people grow older, constipation is more common. A low-fiber diet, not taking in enough fluids, and taking certain medicines may make constipation worse.     Take Two 15 mg tablets of Senna (sennosides), one or twice a day.     Take Mirilax (polyethylene glycol ) 17 G dissolved in 120 to 240 ml of beverage, once daily for the next 1 week.    Consider Mineral Oil enema if no improvement over the next 3-4 days followup package recommendations.    CAUSES  Certain medicines, such as antidepressants, pain medicine, iron supplements, antacids, and water pills.    Certain diseases, such as diabetes, irritable bowel syndrome (IBS), thyroid disease, or depression.    Not drinking enough water.    Not eating enough fiber-rich foods.    Stress or travel.    Lack of physical activity or exercise.    Ignoring the urge to have a bowel movement.    Using laxatives too much.      SIGNS AND SYMPTOMS  Having fewer than three bowel movements a week.    Straining to have a bowel movement.    Having stools that are hard, dry, or larger than normal.    Feeling full or bloated.    Pain in the lower abdomen.    Not feeling relief after having a bowel movement.      DIAGNOSIS  Your health care provider will take a medical history and perform a physical exam. Further testing may be done for severe constipation. Some tests may include:    A barium enema X-ray to examine your rectum, colon, and, sometimes, your small intestine.    A sigmoidoscopy to examine your lower colon.    A colonoscopy to examine your entire colon.     TREATMENT  Treatment will depend on the severity of your constipation and what is causing it. Some dietary treatments include drinking more fluids and eating more fiber-rich foods. Lifestyle treatments may include regular exercise. If these diet and lifestyle recommendations do not help, your health care provider may recommend taking over-the-counter laxative medicines to help you have bowel movements. Prescription medicines may be prescribed if over-the-counter medicines do not work.     HOME CARE INSTRUCTIONS  Eat foods that have a lot of fiber, such as fruits, vegetables, whole grains, and beans.  Limit foods high in fat and processed sugars, such as french fries, hamburgers, cookies, candies, and soda.    A fiber supplement may be added to your diet if you cannot get enough fiber from foods.    Drink enough fluids to keep your urine clear or pale yellow.    Exercise regularly or as directed by your health care provider.    Go to the restroom when you have the urge to go. Do not hold it.    Only take over-the-counter or prescription medicines as directed by your health care provider. Do not take other medicines for constipation without talking to your health care provider first.       SEEK IMMEDIATE MEDICAL CARE IF:  You have bright red blood in your stool.    Your constipation lasts for more than 4 days or gets worse.    You have abdominal or rectal pain.    You have thin, pencil-like stools.    You have unexplained weight loss.     MAKE SURE YOU:  Understand these instructions.  Will watch your condition.  Will get help right away if you are not doing well or get worse.

## 2022-11-17 NOTE — CONSULT NOTE ADULT - SUBJECTIVE AND OBJECTIVE BOX
HPI:  32F hx laparoscopic gastric sleeve in  w/ Dr. Lorenzana, c/s , on chronic methadone p/w abdominal pain and constipation. Per patient, without bowel movement for last 10 days. Takes senna BID, miralax and suppository BID but without relief. Pt. reports pain diffusely, worsening, prompting visit to ED. In ED, pt. labs WNL, had an enema with large BM, reporting feeling better.     Bariatric surgery consulted given past history of gastric sleeve.       PAST MEDICAL & SURGICAL HISTORY:  Morbid obesity      Oxycodone use disorder, mild, abuse      S/P       Gastric banding status          Allergies    No Known Allergies    Intolerances        SOCIAL HISTORY:    FAMILY HISTORY:          Physical Exam:  General: NAD, resting comfortably  HEENT: NC/AT, EOMI, normal hearing, no oral lesions, no LAD, neck supple  Pulmonary: normal resp effort, CTA-B  Cardiovascular: NSR, no murmurs  Abdominal: soft, ND/NT, no organomegaly  Extremities: WWP, normal strength, no clubbing/cyanosis/edema  Neuro: A/O x 3, CNs II-XII grossly intact, normal sensation, no focal deficits  Pulses: palpable distal pulses    Vital Signs Last 24 Hrs  T(C): 36.8 (2022 12:57), Max: 36.8 (2022 12:57)  T(F): 98.2 (2022 12:57), Max: 98.2 (2022 12:57)  HR: 90 (2022 16:53) (75 - 90)  BP: 105/72 (2022 16:53) (102/66 - 105/72)  BP(mean): --  RR: 18 (2022 16:53) (18 - 18)  SpO2: 97% (2022 16:53) (97% - 99%)    Parameters below as of 2022 16:53  Patient On (Oxygen Delivery Method): room air        I&O's Summary          LABS:                        11.9   6.98  )-----------( 218      ( 2022 17:45 )             36.2     11-17    137  |  102  |  11  ----------------------------<  86  5.6<H>   |  28  |  0.88    Ca    9.4      2022 17:45    TPro  7.7  /  Alb  4.0  /  TBili  0.4  /  DBili  x   /  AST  25  /  ALT  11  /  AlkPhos  54  11-17        CAPILLARY BLOOD GLUCOSE        LIVER FUNCTIONS - ( 2022 17:45 )  Alb: 4.0 g/dL / Pro: 7.7 g/dL / ALK PHOS: 54 U/L / ALT: 11 U/L / AST: 25 U/L / GGT: x             Cultures:      RADIOLOGY & ADDITIONAL STUDIES:             HPI:  32F hx laparoscopic gastric sleeve in  w/ Dr. Lorenzana, c/s , on chronic methadone p/w abdominal pain and constipation. Per patient, without bowel movement for last 10 days. Takes senna BID, miralax and suppository BID but without relief. Pt. reports pain diffusely, worsening, prompting visit to ED. In ED, pt. labs WNL, had an enema with large BM, reporting feeling better.     Bariatric surgery consulted given past history of gastric sleeve.       PAST MEDICAL & SURGICAL HISTORY:  Morbid obesity      Oxycodone use disorder, mild, abuse      S/P       Gastric banding status          Allergies    No Known Allergies    Intolerances      Physical Exam:  General: NAD, resting comfortably  HEENT: NC/AT, EOMI, normal hearing  Pulmonary: normal resp effort  Cardiovascular: NSR  Abdominal: soft, ND/NT, no organomegaly  Extremities: WWP, normal strength  Neuro: A/O x 3, CNs II-XII grossly intact, normal sensation, no focal deficits      Vital Signs Last 24 Hrs  T(C): 36.8 (2022 12:57), Max: 36.8 (2022 12:57)  T(F): 98.2 (2022 12:57), Max: 98.2 (2022 12:57)  HR: 90 (2022 16:53) (75 - 90)  BP: 105/72 (2022 16:53) (102/66 - 105/72)  BP(mean): --  RR: 18 (2022 16:53) (18 - 18)  SpO2: 97% (2022 16:53) (97% - 99%)    Parameters below as of 2022 16:53  Patient On (Oxygen Delivery Method): room air        I&O's Summary          LABS:                        11.9   6.98  )-----------( 218      ( 2022 17:45 )             36.2     -    137  |  102  |  11  ----------------------------<  86  5.6<H>   |  28  |  0.88    Ca    9.4      2022 17:45    TPro  7.7  /  Alb  4.0  /  TBili  0.4  /  DBili  x   /  AST  25  /  ALT  11  /  AlkPhos  54  11-17        CAPILLARY BLOOD GLUCOSE        LIVER FUNCTIONS - ( 2022 17:45 )  Alb: 4.0 g/dL / Pro: 7.7 g/dL / ALK PHOS: 54 U/L / ALT: 11 U/L / AST: 25 U/L / GGT: x             Cultures:

## 2022-11-17 NOTE — ED PROVIDER NOTE - OBJECTIVE STATEMENT
32 Y F H/O gastric sleeve, constipation in setting of chronic methadone use presenting with abdominal pain constipation. States 10 days of constipation despite regular senna use and intermittent mirilax, ~10 suppositories with mild irritation. Denies any nausea, vomiting, dysuria, fever, chills.

## 2022-11-17 NOTE — ED ADULT NURSE NOTE - OBJECTIVE STATEMENT
32F aaox4 ambulatory with h/o Morbid obesity Oxycodone use disorder, on methadone, p/w c/o 2 weeks of no bowel movement, tried different meds from dulcolax, senna and suppository without any effect. patient denies nausea or vomiting but afraid to eat because she cannot have any bowel movement.

## 2022-11-17 NOTE — ED PROVIDER NOTE - CLINICAL SUMMARY MEDICAL DECISION MAKING FREE TEXT BOX
32 Y F presenting with constipation minimal improvement on current bowel regiment in setting of chronic methadone use, will check basic labs, enema, re-assess with bariatrics consult per protocol.

## 2022-11-17 NOTE — ED ADULT NURSE NOTE - BREATH SOUNDS, MLM
For the next 24 hours patient needs to be with a responsible adult.    For 24 hours DO NOT drive, operate machinery, appliances, drink alcohol, make important decisions or sign legal documents.    Start with a light or bland diet and advance to regular diet as tolerated.    Follow recommendations on procedure report provided by your doctor.    Call Dr Sears for problems [phone]    Problems may include but not limited to: large amounts of bleeding, trouble breathing, repeated vomiting, severe unrelieved pain, fever or chills.      
Clear

## 2023-01-16 ENCOUNTER — EMERGENCY (EMERGENCY)
Facility: HOSPITAL | Age: 33
LOS: 1 days | Discharge: HOME | End: 2023-01-16
Attending: EMERGENCY MEDICINE
Payer: COMMERCIAL

## 2023-01-16 VITALS
RESPIRATION RATE: 18 BRPM | DIASTOLIC BLOOD PRESSURE: 78 MMHG | WEIGHT: 199.96 LBS | SYSTOLIC BLOOD PRESSURE: 112 MMHG | OXYGEN SATURATION: 100 % | TEMPERATURE: 98 F | HEART RATE: 58 BPM

## 2023-01-16 VITALS
DIASTOLIC BLOOD PRESSURE: 69 MMHG | OXYGEN SATURATION: 100 % | SYSTOLIC BLOOD PRESSURE: 117 MMHG | TEMPERATURE: 98 F | HEART RATE: 64 BPM | RESPIRATION RATE: 16 BRPM

## 2023-01-16 DIAGNOSIS — Z98.89 OTHER SPECIFIED POSTPROCEDURAL STATES: Chronic | ICD-10-CM

## 2023-01-16 DIAGNOSIS — Z98.84 BARIATRIC SURGERY STATUS: Chronic | ICD-10-CM

## 2023-01-16 PROCEDURE — 99283 EMERGENCY DEPT VISIT LOW MDM: CPT

## 2023-01-16 PROCEDURE — 99284 EMERGENCY DEPT VISIT MOD MDM: CPT

## 2023-01-16 RX ORDER — METHADONE HYDROCHLORIDE 40 MG/1
40 TABLET ORAL ONCE
Refills: 0 | Status: DISCONTINUED | OUTPATIENT
Start: 2023-01-16 | End: 2023-01-16

## 2023-01-16 RX ADMIN — METHADONE HYDROCHLORIDE 40 MILLIGRAM(S): 40 TABLET ORAL at 10:36

## 2023-01-16 NOTE — ED PROVIDER NOTE - PATIENT PORTAL LINK FT
You can access the FollowMyHealth Patient Portal offered by [facility] by registering at the following website: [email or web address]. By joining [employer]’s FollowMyHealth portal, you will also be able to view your health information using other applications (apps) compatible with our system.

## 2023-01-16 NOTE — ED PROVIDER NOTE - NSICDXPASTSURGICALHX_GEN_ALL_CORE_FT
PAST SURGICAL HISTORY:  Gastric banding status     S/P       Itraconazole Counseling:  I discussed with the patient the risks of itraconazole including but not limited to liver damage, nausea/vomiting, neuropathy, and severe allergy.  The patient understands that this medication is best absorbed when taken with acidic beverages such as non-diet cola or ginger ale.  The patient understands that monitoring is required including baseline LFTs and repeat LFTs at intervals.  The patient understands that they are to contact us or the primary physician if concerning signs are noted.

## 2023-01-16 NOTE — ED ADULT NURSE NOTE - CADM POA URETHRAL CATHETER
[age] yo female presents with >10 episodes of hematemesis; no abdominal pain; no fevers; no changes in bm nor diarrhea; no anticoagulants; on exam nad, lungs cta nontender abdomen + guiac bloody vomiting; brown stool guiac negative; admit for gi bleed; pt hemodynamically stable at this time; Plan: labs, type, cxr, protonix, gi, admission
No

## 2023-01-16 NOTE — ED PROVIDER NOTE - ATTENDING CONTRIBUTION TO CARE
------------ATTENDING NOTE------------  pt w/ mother c/o being unable to fill today's Methadone dose (40 mg) as clinic closed due to [facility] [place], no additional complaints, pt very well appearing, nontoxic, nad, nml VS, reviewed [place] [facility] [place] w/o recent narcotics (clinic likely to [facility], no PDMP), LMP current, in depth dw all about ddx, tx, reis, continued close outpt fu.   - Tra Myles MD   -------------------------------------------

## 2023-01-16 NOTE — ED PROVIDER NOTE - NSFOLLOWUPINSTRUCTIONS_ED_ALL_ED_FT
See your Primary Doctor as needed for follow up -- call to discuss.    Stay well hydrated, eat regular healthy meals, get plenty of rest, continue current medications.    Seek immediate medical care for new/worsening symptoms/concerns.

## 2023-01-16 NOTE — ED ADULT NURSE NOTE - IN THE PAST 12 MONTHS HAVE YOU USED DRUGS OTHER THAN THOSE REQUIRED FOR MEDICAL REASON?
1)  Call for an appointment with psychiatry: [phone]    2)  Limit your fast food to once weekly.     3) Attempt to be active every day, at least 20 minutes.    4)  Try to have only healthy snack food available; don't buy chips, crackers, cookies, pop.    5)  Your shots are up to date.    Bill Alcocer MD  Internal Medicine and Pediatrics         Patient Education    [employer]- PARENT  11 THROUGH 14 YEAR VISITS  Here are some suggestions from [employer] experts that may be of value to your family.     HOW YOUR FAMILY IS DOING  Encourage your child to be part of family decisions. Give your child the chance to make more of her own decisions as she grows older.  Encourage your child to think through problems with your support.  Help your child find activities she is really interested in, besides schoolwork.  Help your child find and try activities that help others.  Help your child deal with conflict.  Help your child figure out nonviolent ways to handle anger or fear.  If you are worried about your living or food situation, talk with us. Community agencies and programs such as [employer] can also provide information and assistance.    YOUR GROWING AND CHANGING CHILD  Help your child get to the dentist twice a year.  Give your child a fluoride supplement if the dentist recommends it.  Encourage your child to brush her teeth twice a day and floss once a day.  Praise your child when she does something well, not just when she looks good.  Support a healthy body weight and help your child be a healthy eater.  Provide healthy foods.  Eat together as a family.  Be a role model.  Help your child get enough calcium with low-fat or fat-free milk, low-fat yogurt, and cheese.  Encourage your child to get at least 1 hour of physical activity every day. Make sure she uses helmets and other safety gear.  Consider making a family media use plan. Make rules for media use and balance your child s time for physical  activities and other activities.  Check in with your child s teacher about grades. Attend back-to-school events, parent-teacher conferences, and other school activities if possible.  Talk with your child as she takes over responsibility for schoolwork.  Help your child with organizing time, if she needs it.  Encourage daily reading.  YOUR CHILD S FEELINGS  Find ways to spend time with your child.  If you are concerned that your child is sad, depressed, nervous, irritable, hopeless, or angry, let us know.  Talk with your child about how his body is changing during puberty.  If you have questions about your child s sexual development, you can always talk with us.    HEALTHY BEHAVIOR CHOICES  Help your child find fun, safe things to do.  Make sure your child knows how you feel about alcohol and drug use.  Know your child s friends and their parents. Be aware of where your child is and what he is doing at all times.  Lock your liquor in a cabinet.  Store prescription medications in a locked cabinet.  Talk with your child about relationships, sex, and values.  If you are uncomfortable talking about puberty or sexual pressures with your child, please ask us or others you trust for reliable information that can help.  Use clear and consistent rules and discipline with your child.  Be a role model.    SAFETY  Make sure everyone always wears a lap and shoulder seat belt in the car.  Provide a properly fitting helmet and safety gear for biking, skating, in-line skating, skiing, snowmobiling, and horseback riding.  Use a hat, sun protection clothing, and sunscreen with SPF of 15 or higher on her exposed skin. Limit time outside when the sun is strongest (11:00 am-3:00 pm).  Don t allow your child to ride ATVs.  Make sure your child knows how to get help if she feels unsafe.  If it is necessary to keep a gun in your home, store it unloaded and locked with the ammunition locked separately from the gun.          Helpful  Resources:  Family Media Use Plan: www.healthychildren.org/MediaUsePlan   Consistent with Bright Futures: Guidelines for Health Supervision of Infants, Children, and Adolescents, 4th Edition  For more information, go to https://brightfutures.aap.org.            No
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
## 2023-01-16 NOTE — ED PROVIDER NOTE - OBJECTIVE STATEMENT
32-year-old female on stable methadone treatment here because she missed a single dose of methadone today due to the clinic being closed for the holiday.  At this time she has no other complaints.  She denies chest pain, nausea, vomiting, fevers, chills.

## 2023-01-17 NOTE — ED POST DISCHARGE NOTE - RESULT SUMMARY
Recovery center called wanting to confirm dose of methadone pt received yesterday. Explained he got Methadone 40 PO x1.  Jacinta

## 2023-03-02 ENCOUNTER — APPOINTMENT (OUTPATIENT)
Dept: PEDIATRICS | Facility: CLINIC | Age: 33
End: 2023-03-02
Payer: SELF-PAY

## 2023-03-02 VITALS
HEART RATE: 73 BPM | SYSTOLIC BLOOD PRESSURE: 98 MMHG | RESPIRATION RATE: 14 BRPM | TEMPERATURE: 98.2 F | DIASTOLIC BLOOD PRESSURE: 69 MMHG | BODY MASS INDEX: 27.5 KG/M2 | WEIGHT: 203 LBS | HEIGHT: 72 IN | OXYGEN SATURATION: 99 %

## 2023-03-02 DIAGNOSIS — Z11.1 ENCOUNTER FOR SCREENING FOR RESPIRATORY TUBERCULOSIS: ICD-10-CM

## 2023-03-02 PROCEDURE — 86580 TB INTRADERMAL TEST: CPT | Mod: NC

## 2023-03-02 NOTE — HISTORY OF PRESENT ILLNESS
[Yes] : Patient goes to dentist yearly [Irregular menses] : irregular menses [FreeTextEntry7] : [facility] Employee Physical Exam [de-identified] : health history form reviewed. [FreeTextEntry1] : takes seroquel and clonidine for sleep

## 2023-05-06 ENCOUNTER — EMERGENCY (EMERGENCY)
Facility: HOSPITAL | Age: 33
LOS: 1 days | Discharge: HOME | End: 2023-05-06
Attending: EMERGENCY MEDICINE
Payer: COMMERCIAL

## 2023-05-06 VITALS
DIASTOLIC BLOOD PRESSURE: 70 MMHG | TEMPERATURE: 99 F | HEART RATE: 84 BPM | RESPIRATION RATE: 16 BRPM | OXYGEN SATURATION: 100 % | SYSTOLIC BLOOD PRESSURE: 109 MMHG

## 2023-05-06 VITALS
HEART RATE: 90 BPM | TEMPERATURE: 100 F | OXYGEN SATURATION: 95 % | DIASTOLIC BLOOD PRESSURE: 82 MMHG | SYSTOLIC BLOOD PRESSURE: 121 MMHG | RESPIRATION RATE: 20 BRPM

## 2023-05-06 DIAGNOSIS — Z98.89 OTHER SPECIFIED POSTPROCEDURAL STATES: Chronic | ICD-10-CM

## 2023-05-06 DIAGNOSIS — Z98.84 BARIATRIC SURGERY STATUS: Chronic | ICD-10-CM

## 2023-05-06 LAB — HCG UR QL: NEGATIVE — SIGNIFICANT CHANGE UP

## 2023-05-06 PROCEDURE — 99283 EMERGENCY DEPT VISIT LOW MDM: CPT | Mod: 25

## 2023-05-06 PROCEDURE — 71046 X-RAY EXAM CHEST 2 VIEWS: CPT

## 2023-05-06 PROCEDURE — 71046 X-RAY EXAM CHEST 2 VIEWS: CPT | Mod: 26

## 2023-05-06 PROCEDURE — 81025 URINE PREGNANCY TEST: CPT

## 2023-05-06 PROCEDURE — 99284 EMERGENCY DEPT VISIT MOD MDM: CPT

## 2023-05-06 RX ORDER — LIDOCAINE 4 G/100G
1 CREAM TOPICAL ONCE
Refills: 0 | Status: COMPLETED | OUTPATIENT
Start: 2023-05-06 | End: 2023-05-06

## 2023-05-06 RX ORDER — ACETAMINOPHEN 500 MG
975 TABLET ORAL ONCE
Refills: 0 | Status: COMPLETED | OUTPATIENT
Start: 2023-05-06 | End: 2023-05-06

## 2023-05-06 RX ORDER — SODIUM CHLORIDE 9 MG/ML
1000 INJECTION, SOLUTION INTRAVENOUS ONCE
Refills: 0 | Status: DISCONTINUED | OUTPATIENT
Start: 2023-05-06 | End: 2023-05-06

## 2023-05-06 RX ORDER — IBUPROFEN 200 MG
600 TABLET ORAL ONCE
Refills: 0 | Status: COMPLETED | OUTPATIENT
Start: 2023-05-06 | End: 2023-05-06

## 2023-05-06 RX ORDER — ACETAMINOPHEN 500 MG
1000 TABLET ORAL ONCE
Refills: 0 | Status: DISCONTINUED | OUTPATIENT
Start: 2023-05-06 | End: 2023-05-06

## 2023-05-06 RX ADMIN — Medication 600 MILLIGRAM(S): at 22:35

## 2023-05-06 RX ADMIN — Medication 975 MILLIGRAM(S): at 21:51

## 2023-05-06 RX ADMIN — Medication 600 MILLIGRAM(S): at 21:51

## 2023-05-06 RX ADMIN — LIDOCAINE 1 PATCH: 4 CREAM TOPICAL at 22:37

## 2023-05-06 RX ADMIN — Medication 975 MILLIGRAM(S): at 22:35
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
## 2023-05-06 NOTE — ED PROVIDER NOTE - PHYSICAL EXAMINATION
Well Appearing, Nontoxic, NAD;  Symm Facies, PERRL 3mm, (-)Pallor, nares/TM clear, MMM, no dental pain/malocclusion or intraoral injury;  No JVD/Bruits or stridor, no midline radha CTL spine tenderness;  RRR w/o m/g/r, equal distal pulses;   CTAB w/o distress or crepitus;   Abd soft, nt/nd, +bs;  AOX3, Normal speech, CN grossly intact, normal strength/sensation/gait

## 2023-05-06 NOTE — ED PROVIDER NOTE - PATIENT PORTAL LINK FT
You can access the FollowMyHealth Patient Portal offered by [facility] by registering at the following website: [email or web address]. By joining [employer]’s FollowMyHealth portal, you will also be able to view your health information using other applications (apps) compatible with our system.
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
## 2023-05-16 ENCOUNTER — EMERGENCY (EMERGENCY)
Facility: HOSPITAL | Age: 33
LOS: 1 days | Discharge: HOME | End: 2023-05-16
Attending: EMERGENCY MEDICINE
Payer: MEDICAID

## 2023-05-16 VITALS
SYSTOLIC BLOOD PRESSURE: 108 MMHG | OXYGEN SATURATION: 99 % | RESPIRATION RATE: 18 BRPM | HEART RATE: 67 BPM | WEIGHT: 190.92 LBS | DIASTOLIC BLOOD PRESSURE: 79 MMHG | TEMPERATURE: 98 F | HEIGHT: 72 IN

## 2023-05-16 DIAGNOSIS — Z98.84 BARIATRIC SURGERY STATUS: Chronic | ICD-10-CM

## 2023-05-16 DIAGNOSIS — Z98.89 OTHER SPECIFIED POSTPROCEDURAL STATES: Chronic | ICD-10-CM

## 2023-05-16 PROCEDURE — 99283 EMERGENCY DEPT VISIT LOW MDM: CPT

## 2023-05-17 VITALS
TEMPERATURE: 98 F | DIASTOLIC BLOOD PRESSURE: 63 MMHG | RESPIRATION RATE: 16 BRPM | OXYGEN SATURATION: 100 % | HEART RATE: 79 BPM | SYSTOLIC BLOOD PRESSURE: 101 MMHG

## 2023-05-17 PROCEDURE — 99284 EMERGENCY DEPT VISIT MOD MDM: CPT

## 2023-05-17 RX ORDER — IBUPROFEN 200 MG
600 TABLET ORAL ONCE
Refills: 0 | Status: COMPLETED | OUTPATIENT
Start: 2023-05-17 | End: 2023-05-17

## 2023-05-17 RX ORDER — OXYCODONE HYDROCHLORIDE 5 MG/1
1 TABLET ORAL
Qty: 16 | Refills: 0
Start: 2023-05-17 | End: 2023-05-20

## 2023-05-17 RX ORDER — OXYCODONE HYDROCHLORIDE 5 MG/1
10 TABLET ORAL ONCE
Refills: 0 | Status: DISCONTINUED | OUTPATIENT
Start: 2023-05-17 | End: 2023-05-17

## 2023-05-17 RX ORDER — ACETAMINOPHEN 500 MG
975 TABLET ORAL ONCE
Refills: 0 | Status: COMPLETED | OUTPATIENT
Start: 2023-05-17 | End: 2023-05-17

## 2023-05-17 RX ADMIN — Medication 975 MILLIGRAM(S): at 05:33

## 2023-05-17 RX ADMIN — Medication 600 MILLIGRAM(S): at 05:33

## 2023-05-17 RX ADMIN — OXYCODONE HYDROCHLORIDE 10 MILLIGRAM(S): 5 TABLET ORAL at 05:33

## 2023-05-17 NOTE — ED PROVIDER NOTE - NSFOLLOWUPINSTRUCTIONS_ED_ALL_ED_FT
Continue your antibiotics. Take oxycodone 5 mg once every 6h hours. Call the dental clinic number at 8.15am and tell them you were seen in the ER today - they will see you today. Return if you have swelling, fevers, chills, nausea, vomiting.
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
## 2023-05-17 NOTE — ED PROVIDER NOTE - ATTENDING CONTRIBUTION TO CARE
RGUJRAL 33-year-old female presents with left tooth #14 pain.  Patient states she had part of the root canal on Monday and is on antibiotics and oxycodone, Motrin with persistent pain.  Patient states she ran out of her oxycodone.  Patient is trying to schedule her procedure but unable to obtain appointment.  Denies any difficulty breathing swallowing fevers chills.  On exam patient is well-appearing no acute distress on exam speaking full sentences.  Oropharynx clear.  No stridor.  No gum or tooth tender to palpation on exam.  No facial swelling.  Lungs clear to auscultation bilaterally.  Abdomen soft nontender.  Discussed with patient for pain control and will consult dental for close follow-up.

## 2023-05-17 NOTE — ED PROVIDER NOTE - PHYSICAL EXAMINATION
GENERAL: no acute distress, non-toxic appearing  HEAD: normocephalic, atraumatic  HEENT: +2nd molar upper left mandible ttp, no swelling, pus visualized, normal conjunctiva, oral mucosa moist, neck supple  CARDIAC: regular rate and rhythm, normal S1 and S2,  no appreciable murmurs  PULM: clear to ascultation bilaterally, no crackles, rales, rhonchi, or wheezing  NEURO: alert and oriented x 3, normal speech, moving all extremities  MSK: no visible deformities, no peripheral edema, calf tenderness/redness/swelling  SKIN: no visible rashes, dry, well-perfused  PSYCH: appropriate mood and affect

## 2023-05-17 NOTE — ED PROVIDER NOTE - OBJECTIVE STATEMENT
33-year-old female no past medical history presents with tooth pain for the last week.  On Monday patient had a root canal on the second to last molar on the upper left mandible.  Refill was not completed and patient is scheduled for a follow-up appointment on Thursday however patient began having pain soon after.  Patient states that she has no swelling, no fevers, chills, nausea, vomiting, difficulty tolerating secretions, speaking, swallowing.  Has been given antibiotics, oxycodone 5 mg, Motrin however this is not controlling the pain.

## 2023-05-17 NOTE — ED ADULT NURSE NOTE - CAS ELECT INFOMATION PROVIDED
follow up with dental clinic, worsening s/s return to ED, pt verbalizes understanding/DC instructions

## 2023-05-17 NOTE — ED PROVIDER NOTE - CLINICAL SUMMARY MEDICAL DECISION MAKING FREE TEXT BOX
32 yo F no med hx with dental pain with no evidence of infection - pt on abx. VSS. Will control pain. No indication to CT as there is no evidence of abscess. Will contact dental for appointment.

## 2023-05-17 NOTE — ED PROVIDER NOTE - PATIENT PORTAL LINK FT
You can access the FollowMyHealth Patient Portal offered by [facility] by registering at the following website: [email or web address]. By joining [employer]’s FollowMyHealth portal, you will also be able to view your health information using other applications (apps) compatible with our system.

## 2023-06-02 ENCOUNTER — APPOINTMENT (OUTPATIENT)
Dept: FAMILY MEDICINE | Facility: CLINIC | Age: 33
End: 2023-06-02
Payer: MEDICAID

## 2023-06-02 VITALS
WEIGHT: 193 LBS | SYSTOLIC BLOOD PRESSURE: 123 MMHG | DIASTOLIC BLOOD PRESSURE: 70 MMHG | RESPIRATION RATE: 14 BRPM | BODY MASS INDEX: 26.14 KG/M2 | TEMPERATURE: 97.7 F | HEART RATE: 93 BPM | HEIGHT: 72 IN | OXYGEN SATURATION: 99 %

## 2023-06-02 DIAGNOSIS — Z82.49 FAMILY HISTORY OF ISCHEMIC HEART DISEASE AND OTHER DISEASES OF THE CIRCULATORY SYSTEM: ICD-10-CM

## 2023-06-02 DIAGNOSIS — Z82.5 FAMILY HISTORY OF ASTHMA AND OTHER CHRONIC LOWER RESPIRATORY DISEASES: ICD-10-CM

## 2023-06-02 DIAGNOSIS — Z83.49 FAMILY HISTORY OF OTHER ENDOCRINE, NUTRITIONAL AND METABOLIC DISEASES: ICD-10-CM

## 2023-06-02 DIAGNOSIS — E55.9 VITAMIN D DEFICIENCY, UNSPECIFIED: ICD-10-CM

## 2023-06-02 DIAGNOSIS — Z81.8 FAMILY HISTORY OF OTHER MENTAL AND BEHAVIORAL DISORDERS: ICD-10-CM

## 2023-06-02 PROCEDURE — 99385 PREV VISIT NEW AGE 18-39: CPT
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
## 2023-06-07 NOTE — ASSESSMENT
[FreeTextEntry1] : Routine medical examination\par VSS- exam normal \par c/w current medications\par advised follow up with psychiatrist,  pT, chiropractor, orthopedics, acuputunist, neuro \par Advised healthy diet and exercise\par will follow up labs \par patient verbalizes understanding and patient is stable upon discharge\par

## 2023-06-09 ENCOUNTER — APPOINTMENT (OUTPATIENT)
Dept: FAMILY MEDICINE | Facility: CLINIC | Age: 33
End: 2023-06-09
Payer: MEDICAID

## 2023-06-09 VITALS
OXYGEN SATURATION: 97 % | TEMPERATURE: 96.2 F | DIASTOLIC BLOOD PRESSURE: 75 MMHG | HEART RATE: 101 BPM | SYSTOLIC BLOOD PRESSURE: 119 MMHG

## 2023-06-09 PROCEDURE — 99214 OFFICE O/P EST MOD 30 MIN: CPT

## 2023-06-16 NOTE — HISTORY OF PRESENT ILLNESS
[FreeTextEntry8] : 34 yo f, s/p sleeve gastrectomy (in 2015), anxiety and depression, on methadone, c/o bloody stools \par chronic constipation due to the methadone\par blood mixed in with stool \par has stopped \par mild abdominal pain in her pit\par denies any history of hemorrhoids\par stool was hard, brown \par used to use laxatives \par never had colonoscopy prior\par never had bleeding prior \par appetite has been normal \par denies any other associated symptoms\par denies any other complaints or concerns at this time

## 2023-06-16 NOTE — PHYSICAL EXAM
[Normal] : soft, non-tender, non-distended, no masses palpated, no HSM and normal bowel sounds [de-identified] : no rebound or guarding, negative pennington's sign, negative mcburney's point

## 2023-06-16 NOTE — ASSESSMENT
[FreeTextEntry1] : VSS, exam normal\par medication as prescribed, medication education done \par reviewed labs with patient \par referred to GI\par advised if sx worsens or persists- including but not limited to vomiting, fever or worsening abdominal pain- to go to ED \par follow up in office if sx persists or worsens\par patient verbalizes understanding and is stable upon d/c\par

## 2023-06-20 ENCOUNTER — APPOINTMENT (OUTPATIENT)
Dept: FAMILY MEDICINE | Facility: CLINIC | Age: 33
End: 2023-06-20
Payer: MEDICAID

## 2023-06-20 VITALS
SYSTOLIC BLOOD PRESSURE: 120 MMHG | RESPIRATION RATE: 14 BRPM | WEIGHT: 195 LBS | HEIGHT: 72 IN | HEART RATE: 73 BPM | OXYGEN SATURATION: 99 % | BODY MASS INDEX: 26.41 KG/M2 | DIASTOLIC BLOOD PRESSURE: 70 MMHG | TEMPERATURE: 98.1 F

## 2023-06-20 DIAGNOSIS — Z01.818 ENCOUNTER FOR OTHER PREPROCEDURAL EXAMINATION: ICD-10-CM

## 2023-06-20 PROCEDURE — 99214 OFFICE O/P EST MOD 30 MIN: CPT

## 2023-06-20 RX ORDER — CLONIDINE HYDROCHLORIDE 0.2 MG/1
0.2 TABLET ORAL 3 TIMES DAILY
Refills: 0 | Status: ACTIVE | COMMUNITY

## 2023-06-20 RX ORDER — MIRTAZAPINE 15 MG/1
15 TABLET, FILM COATED ORAL
Refills: 0 | Status: ACTIVE | COMMUNITY
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
## 2023-07-01 NOTE — ED BEHAVIORAL HEALTH ASSESSMENT NOTE - SAFETY PLAN ADDT'L DETAILS
hypokalemia and ACS rule out Education provided regarding environmental safety / lethal means restriction/Provision of National Suicide Prevention Lifeline 9-918-282-ZREX (9132)/Safety plan discussed with...

## 2023-07-05 NOTE — HISTORY OF PRESENT ILLNESS
[Procedure: ___] : Procedure performed: [unfilled]  [Date of Surgery: ___] : Date of Surgery:   [unfilled] [Surgeon Name:   ___] : Surgeon Name: Dr. KEANE [Pre-Op Weight ___] : Pre-op weight was [unfilled] lbs [___ Years Post Op] : [unfilled] years [de-identified] : Lab work done with PCP in 6/2023 shows low Vitamin B12 & Vitamin D  [Diabetes] : no Diabetes [Hypertension] : no Hypertension [Sleep Apnea] : no Sleep Apnea [Hyperlipidemia] : no Hyperlipidemia

## 2023-07-06 ENCOUNTER — APPOINTMENT (OUTPATIENT)
Dept: SURGERY | Facility: CLINIC | Age: 33
End: 2023-07-06

## 2023-07-11 ENCOUNTER — RX RENEWAL (OUTPATIENT)
Age: 33
End: 2023-07-11

## 2023-07-12 ENCOUNTER — RX RENEWAL (OUTPATIENT)
Age: 33
End: 2023-07-12

## 2023-07-26 ENCOUNTER — EMERGENCY (EMERGENCY)
Facility: HOSPITAL | Age: 33
LOS: 1 days | Discharge: HOME | End: 2023-07-26
Attending: STUDENT IN AN ORGANIZED HEALTH CARE EDUCATION/TRAINING PROGRAM
Payer: MEDICAID

## 2023-07-26 VITALS
SYSTOLIC BLOOD PRESSURE: 112 MMHG | WEIGHT: 192.02 LBS | TEMPERATURE: 98 F | HEART RATE: 94 BPM | DIASTOLIC BLOOD PRESSURE: 76 MMHG | OXYGEN SATURATION: 100 % | RESPIRATION RATE: 20 BRPM | HEIGHT: 72 IN

## 2023-07-26 DIAGNOSIS — Z98.84 BARIATRIC SURGERY STATUS: Chronic | ICD-10-CM

## 2023-07-26 DIAGNOSIS — Z98.89 OTHER SPECIFIED POSTPROCEDURAL STATES: Chronic | ICD-10-CM

## 2023-07-26 PROCEDURE — 99284 EMERGENCY DEPT VISIT MOD MDM: CPT

## 2023-07-27 VITALS
OXYGEN SATURATION: 100 % | RESPIRATION RATE: 18 BRPM | DIASTOLIC BLOOD PRESSURE: 75 MMHG | HEART RATE: 62 BPM | SYSTOLIC BLOOD PRESSURE: 111 MMHG

## 2023-07-27 PROCEDURE — 99284 EMERGENCY DEPT VISIT MOD MDM: CPT

## 2023-07-27 RX ORDER — ACETAMINOPHEN 500 MG
975 TABLET ORAL ONCE
Refills: 0 | Status: COMPLETED | OUTPATIENT
Start: 2023-07-27 | End: 2023-07-27

## 2023-07-27 RX ORDER — OXYCODONE HYDROCHLORIDE 5 MG/1
1 TABLET ORAL
Qty: 6 | Refills: 0
Start: 2023-07-27 | End: 2023-07-29

## 2023-07-27 RX ORDER — OXYCODONE HYDROCHLORIDE 5 MG/1
5 TABLET ORAL ONCE
Refills: 0 | Status: DISCONTINUED | OUTPATIENT
Start: 2023-07-27 | End: 2023-07-27

## 2023-07-27 RX ORDER — KETOROLAC TROMETHAMINE 30 MG/ML
15 SYRINGE (ML) INJECTION ONCE
Refills: 0 | Status: DISCONTINUED | OUTPATIENT
Start: 2023-07-27 | End: 2023-07-27

## 2023-07-27 RX ADMIN — Medication 975 MILLIGRAM(S): at 04:10

## 2023-07-27 RX ADMIN — Medication 975 MILLIGRAM(S): at 01:47

## 2023-07-27 RX ADMIN — Medication 15 MILLIGRAM(S): at 04:10

## 2023-07-27 RX ADMIN — OXYCODONE HYDROCHLORIDE 5 MILLIGRAM(S): 5 TABLET ORAL at 04:08

## 2023-07-27 RX ADMIN — Medication 15 MILLIGRAM(S): at 01:48

## 2023-07-27 NOTE — ED PROVIDER NOTE - CLINICAL SUMMARY MEDICAL DECISION MAKING FREE TEXT BOX
33 year old female with recent teeth extractions comes into the ED for eval of drainage and pain at site of extraction. Ddx include dental infection or abscess. Will order basic labs and consult dental team. Will give pain meds for symptom control, Pt still has abx. Dispo pending labs and dental recommendations. Pt will likely be dc home to follow up with her dentist and continue finishing her abx course.

## 2023-07-27 NOTE — ED PROVIDER NOTE - PATIENT PORTAL LINK FT
You can access the FollowMyHealth Patient Portal offered by [facility] by registering at the following website: [email or web address]. By joining [employer]’s FollowMyHealth portal, you will also be able to view your health information using other applications (apps) compatible with our system.
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
## 2023-07-27 NOTE — ED PROVIDER NOTE - OBJECTIVE STATEMENT
33 year old female comes into the ED for eval of severe pain in the right bottom molar and left upper molar after having 3 teeth extracted ~1 week ago. She reports finishing a course of clinda and is now on a dose of Augment. She was seen in [facility] yesterday for dental pain. She states there is a drainage and foul oder coming from the sites of the extracted teeth. She has been having chills but no objective fever.

## 2023-07-27 NOTE — ED PROVIDER NOTE - PHYSICAL EXAMINATION
GENERAL: Not in acute distress, non-toxic appearing  HEAD: normocephalic, atraumatic  HEENT: + drainage from the left bottom molar with tenderness to palpation, EOMI, normal conjunctiva, oral mucosa moist, neck supple  CARDIAC: appears well perfused  PULM: normal work of breathing  GI: abdomen nondistended  NEURO: alert and oriented x 3, normal speech, no focal motor or sensory deficits, gait normal, no gross neurologic deficit  MSK: No visible deformities, no peripheral edema,   SKIN: No visible rashes, dry, well-perfused  PSYCH: appropriate mood and affect

## 2023-07-27 NOTE — ED PROVIDER NOTE - NSFOLLOWUPINSTRUCTIONS_ED_ALL_ED_FT
You were seen in the ED for your dental pain. You were seen in the ED by the Dental team who agree this is likely a dental infection. Please finished the course of Augmentin that you were given and follow up with your dentist in 2-3 days. They may have to give you a longer dose of antibiotics as well as more pain medications.     If you start to have a fever, swelling of your mouth/throat, change in voice, trouble breathing, feeling more lethargic, or other new/worsening symptoms please come back to the ED.    Rest, drink plenty of fluids.  Advance activity as tolerated.  Continue all previously prescribed medications as directed.  Follow up with your primary care physician in 48-72 hours- bring copies of your results.  Return to the ER for worsening or persistent symptoms, and/or ANY NEW OR CONCERNING SYMPTOMS. If you have issues obtaining follow up, please call: [phone] to obtain a doctor or specialist who takes your insurance in your area.

## 2023-07-27 NOTE — ED ADULT NURSE NOTE - OBJECTIVE STATEMENT
Pt is a 33y female who presents to [facility] ER c/o of dental pain. Pt endorses last week on the 17th she lost her R side lower molar, pt saw her dentist yesterday and was told she has a dry socket and an infection, was prescribed PO clindamycin and Augmentin to take x2 pills a day. Pt currently endorses 10/10 pain, states she has been experiencing HA, it hurts to swallow, throbbing/itching pain of the R side of her face so she came to the ED for eval. Pt does endorses having a fever of 100.9F a few days ago which has subsided. PMH Arthritis and no significant PSH. Pt is A&Ox4 speaking in full coherent sentences, breathing is spontaneous and unlabored. Pt ambulates independently at baseline.

## 2023-07-27 NOTE — ED PROVIDER NOTE - ATTENDING CONTRIBUTION TO CARE
I, Dr. Xiomara Aleman, have personally performed a face to face medical and diagnostic evaluation of the patient. I have discussed with and reviewed the Resident's and/or ACP's and/or Medical/PA/NP student's note and agree with the History, ROS, Physical Exam and MDM unless otherwise indicated. A brief summary of my personal evaluation and impression can be found below.    Agree with above.    Patient is a 33-year-old female, no pertinent past medical history presenting with right lower molar pain following dental extraction a week ago.  Reports severe pain, small amount of drainage and foul taste in her mouth since the extraction.  She was prescribed clindamycin by her dentist which was then changed a few days ago to Augmentin at [facility].  Reports chills, no fevers.    Denies fevers, nausea, vomiting, difficulty swallowing, drooling.    Well-appearing female, appears comfortable, right lower molar socket with small amount of cotton in place, no appreciable drainage or gingival swelling/purulence.    Well-appearing otherwise healthy female presenting with dental pain following extraction.  No overt signs of significant infection but likely is suffering from dry socket.  Patient amenable to IM Toradol injection which has worked for her.  Dental consulted for further recommendations.  Will likely DC with short course of oxycodone.

## 2023-07-27 NOTE — ED PROVIDER NOTE - PROGRESS NOTE DETAILS
Efren Rivera PGY2:  Pt was seen by Dental who agree that she likely has a dental infection. They recommend that the Pt follows up with her dentist in 2 days. She has 2 more days of Augmentin left. Advised to finish her dose of abx. Will give dose of oxycodone here and send home. Pt is agreeable with plan to be dc home.

## 2023-07-30 ENCOUNTER — EMERGENCY (EMERGENCY)
Facility: HOSPITAL | Age: 33
LOS: 1 days | Discharge: HOME | End: 2023-07-30
Attending: EMERGENCY MEDICINE
Payer: MEDICAID

## 2023-07-30 VITALS
TEMPERATURE: 99 F | RESPIRATION RATE: 18 BRPM | OXYGEN SATURATION: 100 % | HEART RATE: 66 BPM | DIASTOLIC BLOOD PRESSURE: 86 MMHG | SYSTOLIC BLOOD PRESSURE: 128 MMHG

## 2023-07-30 VITALS
WEIGHT: 192.02 LBS | DIASTOLIC BLOOD PRESSURE: 78 MMHG | TEMPERATURE: 98 F | HEART RATE: 94 BPM | RESPIRATION RATE: 18 BRPM | HEIGHT: 72 IN | OXYGEN SATURATION: 98 % | SYSTOLIC BLOOD PRESSURE: 119 MMHG

## 2023-07-30 DIAGNOSIS — Z98.89 OTHER SPECIFIED POSTPROCEDURAL STATES: Chronic | ICD-10-CM

## 2023-07-30 DIAGNOSIS — Z98.84 BARIATRIC SURGERY STATUS: Chronic | ICD-10-CM

## 2023-07-30 PROCEDURE — 96372 THER/PROPH/DIAG INJ SC/IM: CPT

## 2023-07-30 PROCEDURE — 99284 EMERGENCY DEPT VISIT MOD MDM: CPT | Mod: 25

## 2023-07-30 PROCEDURE — 99284 EMERGENCY DEPT VISIT MOD MDM: CPT

## 2023-07-30 RX ORDER — ACETAMINOPHEN 500 MG
975 TABLET ORAL ONCE
Refills: 0 | Status: COMPLETED | OUTPATIENT
Start: 2023-07-30 | End: 2023-07-30

## 2023-07-30 RX ORDER — OXYCODONE HYDROCHLORIDE 5 MG/1
5 TABLET ORAL ONCE
Refills: 0 | Status: DISCONTINUED | OUTPATIENT
Start: 2023-07-30 | End: 2023-07-30

## 2023-07-30 RX ORDER — OXYCODONE HYDROCHLORIDE 5 MG/1
1 TABLET ORAL
Qty: 8 | Refills: 0
Start: 2023-07-30 | End: 2023-07-31

## 2023-07-30 RX ORDER — IBUPROFEN 200 MG
600 TABLET ORAL ONCE
Refills: 0 | Status: COMPLETED | OUTPATIENT
Start: 2023-07-30 | End: 2023-07-30

## 2023-07-30 RX ORDER — KETOROLAC TROMETHAMINE 30 MG/ML
30 SYRINGE (ML) INJECTION ONCE
Refills: 0 | Status: DISCONTINUED | OUTPATIENT
Start: 2023-07-30 | End: 2023-07-30

## 2023-07-30 RX ADMIN — Medication 975 MILLIGRAM(S): at 20:04

## 2023-07-30 RX ADMIN — Medication 30 MILLIGRAM(S): at 22:17

## 2023-07-30 RX ADMIN — Medication 600 MILLIGRAM(S): at 22:16

## 2023-07-30 RX ADMIN — Medication 600 MILLIGRAM(S): at 20:03

## 2023-07-30 RX ADMIN — OXYCODONE HYDROCHLORIDE 5 MILLIGRAM(S): 5 TABLET ORAL at 20:03

## 2023-07-30 RX ADMIN — OXYCODONE HYDROCHLORIDE 5 MILLIGRAM(S): 5 TABLET ORAL at 22:17

## 2023-07-30 RX ADMIN — Medication 975 MILLIGRAM(S): at 22:16

## 2023-07-30 NOTE — ED PROVIDER NOTE - OBJECTIVE STATEMENT
34yo female pt with recent 5 teeth extraction (7/13-7/17/2023) by her oral surgeon presents to with worsening dental pain (L>R) today. She's on Clindamycin now. Pt's seen in ED for the same pain on 7/27/2023 and f/ued with her oral surgeon next day, 7/28/23. Denies fever, facial swelling, or N/V. Denies sensory changes or weakness to extremities. Denies difficult swallowing or breathing. Denies CP/SOB/ABD pain.

## 2023-07-30 NOTE — ED PROVIDER NOTE - ATTENDING APP SHARED VISIT CONTRIBUTION OF CARE
Attending note (Miles): 33-year-old female with history of recent extraction of multiple teeth presenting with worsening dental pain had previously had swelling in the right swelling of the left side of face.  No fever no discharge no bleeding.  On exam mild left upper molar and gum swelling but no palpable fluctuance and no visible drainage.  Dental consulted for evaluation for possible dental abscess and evaluated patient no evidence of abscess on Panorex x-rays or other dental evaluation and recommend continuing clindamycin and pain medication as needed.  To follow-up with her dentist within the next 2 days.  Stable for discharge patient agreeable with plan.

## 2023-07-30 NOTE — ED PROVIDER NOTE - PROGRESS NOTE DETAILS
Pt was brought to dental room by dental team. Dental recommended; keep continue clindamycin and outpt f/u with her oral surgeon.

## 2023-07-30 NOTE — ED PROVIDER NOTE - PHYSICAL EXAMINATION
NAD. VSS. Afebrile. No obvious facial swelling. B/L TMJ tender. +Left upper gum swelling with eryth. Normal throat. Lungs clear. Neuro- intact.

## 2023-07-30 NOTE — ED PROVIDER NOTE - NSFOLLOWUPINSTRUCTIONS_ED_ALL_ED_FT
You are seen in ED for dental pain and evaluated by dental team. No urgent dental intervention required at this time.    Keep continue your Clindamycin as prescribed.    Eat soft diet.    Take Ibuprofen (600mg every 8hours with food) or/and Tylenol (2 tablets of 500mg every 8hours) as needed for pain.    Follow up with your oral surgeon in 2days, call tomorrow for appointment.    Return for any concerns fever, facial swelling, difficult breathing, or worsening pain.
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
## 2023-07-30 NOTE — ED PROVIDER NOTE - DATE/TIME 1
R1 Progress Note    Patient seen and examined at bedside, no acute overnight events. No acute complaints, pain well controlled. Patient is ambulating, voiding spontaneously, passing gas, and tolerating regular diet. Denies CP, SOB, N/V, HA, blurred vision, epigastric pain. Patient would like to go home today.    Vital Signs Last 24 Hours  T(C): 36.6 (08-12-22 @ 05:14), Max: 36.8 (08-11-22 @ 09:00)  HR: 64 (08-12-22 @ 05:14) (62 - 70)  BP: 100/66 (08-12-22 @ 05:14) (100/63 - 101/62)  RR: 18 (08-12-22 @ 05:14) (18 - 18)  SpO2: 98% (08-12-22 @ 05:14) (96% - 98%)    Physical Exam:  General: NAD  Abdomen: Soft, non-tender, non-distended, fundus firm  Pelvic: Lochia wnl    Labs:    Blood Type: O Positive  Antibody Screen: Negative  RPR: Negative               11.8   12.45 )-----------( 157      ( 08-11 @ 00:34 )             36.5         MEDICATIONS  (STANDING):  acetaminophen     Tablet .. 975 milliGRAM(s) Oral <User Schedule>  diphtheria/tetanus/pertussis (acellular) Vaccine (ADAcel) 0.5 milliLiter(s) IntraMuscular once  ibuprofen  Tablet. 600 milliGRAM(s) Oral every 6 hours  oxytocin Infusion 333.333 milliUNIT(s)/Min (1000 mL/Hr) IV Continuous <Continuous>  oxytocin Infusion 333.333 milliUNIT(s)/Min (1000 mL/Hr) IV Continuous <Continuous>  oxytocin Infusion. 4 milliUNIT(s)/Min (4 mL/Hr) IV Continuous <Continuous>  prenatal multivitamin 1 Tablet(s) Oral daily  sodium chloride 0.9% lock flush 3 milliLiter(s) IV Push every 8 hours    MEDICATIONS  (PRN):  benzocaine 20%/menthol 0.5% Spray 1 Spray(s) Topical every 6 hours PRN for Perineal discomfort  dibucaine 1% Ointment 1 Application(s) Topical every 6 hours PRN Perineal discomfort  diphenhydrAMINE 25 milliGRAM(s) Oral every 6 hours PRN Pruritus  hydrocortisone 1% Cream 1 Application(s) Topical every 6 hours PRN Moderate Pain (4-6)  lanolin Ointment 1 Application(s) Topical every 6 hours PRN nipple soreness  magnesium hydroxide Suspension 30 milliLiter(s) Oral two times a day PRN Constipation  oxyCODONE    IR 5 milliGRAM(s) Oral every 3 hours PRN Moderate to Severe Pain (4-10)  oxyCODONE    IR 5 milliGRAM(s) Oral once PRN Moderate to Severe Pain (4-10)  pramoxine 1%/zinc 5% Cream 1 Application(s) Topical every 4 hours PRN Moderate Pain (4-6)  simethicone 80 milliGRAM(s) Chew every 4 hours PRN Gas  witch hazel Pads 1 Application(s) Topical every 4 hours PRN Perineal discomfort   30-Jul-2023 20:40

## 2023-07-30 NOTE — ED PROVIDER NOTE - NS ED ATTENDING STATEMENT MOD
This was a shared visit with the BRITTANY. I reviewed and verified the documentation and independently performed the documented:

## 2023-07-30 NOTE — ED PROVIDER NOTE - PATIENT PORTAL LINK FT
You can access the FollowMyHealth Patient Portal offered by [facility] by registering at the following website: [email or web address]. By joining [employer]’s FollowMyHealth portal, you will also be able to view your health information using other applications (apps) compatible with our system.
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
## 2023-08-05 ENCOUNTER — EMERGENCY (EMERGENCY)
Facility: HOSPITAL | Age: 33
LOS: 1 days | Discharge: HOME | End: 2023-08-05
Attending: STUDENT IN AN ORGANIZED HEALTH CARE EDUCATION/TRAINING PROGRAM
Payer: MEDICAID

## 2023-08-05 VITALS
TEMPERATURE: 98 F | RESPIRATION RATE: 17 BRPM | DIASTOLIC BLOOD PRESSURE: 64 MMHG | OXYGEN SATURATION: 97 % | SYSTOLIC BLOOD PRESSURE: 97 MMHG | HEIGHT: 72 IN | HEART RATE: 77 BPM | WEIGHT: 190.04 LBS

## 2023-08-05 VITALS
HEART RATE: 82 BPM | OXYGEN SATURATION: 100 % | RESPIRATION RATE: 16 BRPM | DIASTOLIC BLOOD PRESSURE: 77 MMHG | TEMPERATURE: 97 F | SYSTOLIC BLOOD PRESSURE: 109 MMHG

## 2023-08-05 DIAGNOSIS — Z98.84 BARIATRIC SURGERY STATUS: Chronic | ICD-10-CM

## 2023-08-05 DIAGNOSIS — Z98.89 OTHER SPECIFIED POSTPROCEDURAL STATES: Chronic | ICD-10-CM

## 2023-08-05 PROCEDURE — 99282 EMERGENCY DEPT VISIT SF MDM: CPT

## 2023-08-05 PROCEDURE — 99283 EMERGENCY DEPT VISIT LOW MDM: CPT

## 2023-08-05 RX ORDER — DIPHENHYDRAMINE HYDROCHLORIDE AND LIDOCAINE HYDROCHLORIDE AND ALUMINUM HYDROXIDE AND MAGNESIUM HYDRO
5 KIT
Qty: 1 | Refills: 0
Start: 2023-08-05

## 2023-08-05 RX ORDER — LIDOCAINE 4 G/100G
20 CREAM TOPICAL ONCE
Refills: 0 | Status: COMPLETED | OUTPATIENT
Start: 2023-08-05 | End: 2023-08-05

## 2023-08-05 RX ORDER — DIPHENHYDRAMINE HYDROCHLORIDE AND LIDOCAINE HYDROCHLORIDE AND ALUMINUM HYDROXIDE AND MAGNESIUM HYDRO
5 KIT ONCE
Refills: 0 | Status: COMPLETED | OUTPATIENT
Start: 2023-08-05 | End: 2023-08-05

## 2023-08-05 RX ADMIN — LIDOCAINE 20 MILLILITER(S): 4 CREAM TOPICAL at 16:47

## 2023-08-05 RX ADMIN — DIPHENHYDRAMINE HYDROCHLORIDE AND LIDOCAINE HYDROCHLORIDE AND ALUMINUM HYDROXIDE AND MAGNESIUM HYDRO 5 MILLILITER(S): KIT at 15:38

## 2023-08-05 NOTE — ED PROVIDER NOTE - NSFOLLOWUPINSTRUCTIONS_ED_ALL_ED_FT
Eat soft diet and hydrate.    Magic mouth wash as instructed for pain; every 6hours swash and spit for pain.    Follow up with your oral surgeon or dental clinic ([phone]) for reevaluation, call Monday for appointment.    Return for any concerns, fever, facial swelling, difficult walking, worsening pain.

## 2023-08-05 NOTE — ED PROVIDER NOTE - PATIENT PORTAL LINK FT
You can access the FollowMyHealth Patient Portal offered by [facility] by registering at the following website: [email or web address]. By joining [employer]’s FollowMyHealth portal, you will also be able to view your health information using other applications (apps) compatible with our system.

## 2023-08-05 NOTE — ED PROVIDER NOTE - ATTENDING APP SHARED VISIT CONTRIBUTION OF CARE
I, Barak Vu, performed a history and physical exam of the patient and discussed their management with the resident and/or advanced care provider. I reviewed the resident and/or advanced care provider's note and agree with the documented findings and plan of care. I was present and available for all procedures.    Patient presenting with concern for mouth discomfort reporting that she had 5 teeth removed earlier last month went to the dentist today for dry socket of the right lower jaw otherwise sent to the ER for further evaluation of her pain denies trouble swallowing reports there is some discomfort under her tongue and frontal gum area and some redness but otherwise denies shortness of breath difficulty swallowing swelling or trauma to area.  Took ibuprofen with some relief otherwise denies new medications or other allergic symptoms     Head: normal appearing atraumatic   Head: no scalp abnormalities, no signs of basilar skull fracture  Eyes: eyes are aligned, lids, conjunctivae and sclera are normal  Mouth: Normal lips, tongue, gums and teeth, pharynx is non erythematous, tonsils normal sized and without exudates, uvula is midline, moist mucous membranes scattered inflammation wo focus under the tongue mild erythema no sublingual ttp   Neck: Nontender bilateral tonsilar and anterior cervical nodes, no occipital, auricular, submandibular, submental or supraclavicular nodes, trachea in midline, thyroid lobes not palpable, no crepitus  Neck: trachea midline  Resp:  No respiratory distress  Ext: no visible deformities  Neuro:  Alert and oriented, appears non focal  Skin:  Warm and dry as visualized  Psych:  Normal affect and mood    Concerning for postsurgical changes otherwise consider aphthous ulcers as an etiology we will treat with symptomatic relief discharge with dental follow-up no other emergent HEENT issues at this time discussed with patient return precautions agreeable with plan

## 2023-08-05 NOTE — ED PROVIDER NOTE - OBJECTIVE STATEMENT
34yo female pt returned to ED for mouth pain. She came to ED (7/26, 7/30/2023) for dental pain s/p multiple tooth extraction and evaluated by dental team. She completed multiple courses of antibiotics and last one was 3days ago. Reports the dental pain improved now but noticed gum and mouth pain, burning pain, since yesterday. Denies fever, chills, or cough. Denies facial swelling. Denies sensory changes or weakness to extremities.

## 2023-08-05 NOTE — ED ADULT NURSE NOTE - HOW OFTEN DO YOU HAVE A DRINK CONTAINING ALCOHOL?
"  Physical Therapy Daily Treatment Note     Name: Luciano Henriquez Jr.    Clinic Number: [id number]    Therapy Diagnosis:   Encounter Diagnoses   Name Primary?    Acute bilateral low back pain with bilateral sciatica Yes    Decreased range of motion     Decreased strength, endurance, and mobility        Physician: Juan Arteaga NP    Visit Date: 10/29/2019    Physician Orders: PT Eval and Treat  Medical Diagnosis from Referral: M54.41,M54.42,G89.29 (ICD-10-CM) - Chronic midline low back pain with bilateral sciatica  Evaluation Date: 7/26/2019  Authorization Period Expiration: 12/31/2019  Plan of Care Certification Period:10/4/2019 -11/19/19 (PN due by 10/26/2019)  Visit #/Visits authorized: 6/7 (Healthy Back visit 19, total visit 26)    Time In: 11:08am  Time Out: 12:10pm  Total Treatment Time: 62 minutes  Total Billable Time: 25 minutes    Precautions: Standard    Subjective     Pt reports: he feels he is able to walk more upright, continues to have some minimal pain but not as intense in the mornings when he wakes up as it was prior    He was compliant with home exercise program.  Response to previous treatment: good, appropriate muscle response  Functional change: less pain    Pain: 3/10  Location: bilateral back      Objective     Tested 9/18/19  Lumbar Flexion 60   Lumbar Extension 0   Lumbar Peak Torque 151   Min Torque 45   Test Percent Below Normative Data 22     Luciano received therapeutic exercises to develop strength, endurance, ROM, flexibility, posture and core stabilization for 50 minutes including:    DKTC on SB x 20   LTR on SB x 20  Supine HS stretch /c strap: 2 x 30" hold ea.  Sit<>stand from 18inch platform x 10    [facility] Therapy 10/29/2019   Visit Number 19   VAS Pain Rating 3   Treadmill Time (in min.) 10   Speed 2   Incline 0   Flexion in Lying 20   Manual Therapy 0   Lumbar Weight 78   Repetitions 20   Rating of Perceived Exertion 3   Ice - Z Lie (in min.) 10          Peripheral " muscle strengthening which included 1 set of 15-20 repetitions at a slow, controlled 10-13 second per rep pace focused on strengthening supporting musculature for improved body mechanics and functional mobility.  Pt and therapist focused on proper form during treatment to ensure optimal strengthening of each targeted muscle group.  Machines were utilized including torso rotation, leg extension, leg curl, chest press, upright row, tricep extension, bicep curl, leg press, hip add, and hip abduction.    Luciano received the following manual therapy techniques: none    Home Exercises Provided and Patient Education Provided     Education provided:   - continued compliance with HEP    Written Home Exercises Provided: Patient instructed to cont prior HEP.  Exercises were reviewed and Luciano was able to demonstrate them prior to the end of the session.  Luciano demonstrated good  understanding of the education provided.     See EMR under Patient Instructions for exercises provided prior visit.    Assessment     Patient tolerated treatment session good reporting appropriate muscle response. Good tolerance to MedX Lumbar Extension machine at an increase of 78 ft/lbs. Patient completed 20 repetitions at an RPE of 3. Pt did not wish to increase resistance this visit. Information given about wellness program to continue to maintain his strength and manage low back pain. Patient is progressing well with current POC.     Luciano is progressing well towards his goals.   Pt prognosis is Good.     Pt will continue to benefit from skilled outpatient physical therapy to address the deficits listed in the problem list box on initial evaluation, provide pt/family education and to maximize pt's level of independence in the home and community environment.     Pt's spiritual, cultural and educational needs considered and pt agreeable to plan of care and goals.    Anticipated barriers to physical therapy: chronic pain    Goals: Short Term Goals:  5  weeks  1. Report decreased in pain at worse less than  <   / =  4  /10  to increase tolerance for functional activities. progressing 5/10  2. Pt to increase B popliteal angle by greater than > or = 5 degrees in order to improve flexibility and posture. Met 8/27/19  3. Increased BLE MMT 1/2  to increase tolerance for ADL and work activities. Met 8/27/19  4. Pt to tolerate HEP to improve ROM and independence with ADL's. Met 8/23/19  5. Pt to improve range of motion by 25% to allow for improved functional mobility to allow for improvement in IADLs.  Met 8/27/19    Long Term Goals: 10 weeks  1. Report decreased in pain at worse less than  <   / =  2  /10  to increase tolerance for functional mobility. progressing  2 .Pt to increase B popliteal angle by greater than > or = 10 degrees in order to improve flexibility and posture. progressing  3. Increased BLE MMT 1 grade to increase tolerance for ADL and work activities.progressing  4. Pt will report at CJ% or less limitation on FOTO Lumbar  to demonstrate decrease in disability and improvement in back pain.progressing  5. Pt to be Independent with HEP to improve ROM and independence with ADL's. Met 10/8/19  6. Pt to demonstrate negative Bridge Test in order to show improved core strength for lumbar stabilization. Met 10/8/19  7. Pt to improve range of motion by 75% to allow for improved functional mobility to allow for improvement in IADLs. progressing  8. Pt will report ability to ambulate 20 minutes without increased lumbar pain.progressing   9. Pt will improve MedX testing score to improve by 20 percent to demonstrate improved motor control and strength.progressing     Plan     Plan of Care Expiration: 11/19/19    Assess tolerance to treatment session and progress per POC. Extend POC to finish healthy back protocol to 11/19/19    Stacie Sharma, PT  10/29/2019   Never

## 2023-08-05 NOTE — ED ADULT NURSE NOTE - NSFALLUNIVINTERV_ED_ALL_ED
Bed/Stretcher in lowest position, wheels locked, appropriate side rails in place/Call bell, personal items and telephone in reach/Instruct patient to call for assistance before getting out of bed/chair/stretcher/Non-slip footwear applied when patient is off stretcher/[place] to call system/Physically safe environment - no spills, clutter or unnecessary equipment/Purposeful proactive rounding/Room/bathroom lighting operational, light cord in reach

## 2023-08-05 NOTE — ED ADULT TRIAGE NOTE - CHIEF COMPLAINT QUOTE
"I have painful bumps under my tongue and on the back of my tongue for 2 days"  had four teeth pulled within the last 2 weeks

## 2023-08-05 NOTE — ED PROVIDER NOTE - PROGRESS NOTE DETAILS
Pt states feeling better after mouth wash. Pt states feeling better after mouth wash. Magic mouth wash (mixed 12.5mg Benadryl 10ml, Viscus Lidocaine 10ml, and Maalox 60ml) given with instruction.

## 2023-08-05 NOTE — ED ADULT NURSE NOTE - OBJECTIVE STATEMENT
33y Female AOx4 presents to the ED c/o mouth discomfort. Pt states she had x5 teeth moved on 7/13, 7/15, and 7/15. Went to the dentist today, who referred pt to ED. Areas appear to be well healing. Pt states the discomfort under her tongue, the left side of tongue, and lower front gum area. Endorses increased redness in area. Reports now she is having worsening pain with swallowing. Denies tongue swelling, maintains patent airway. Has been taking ibuprofen, Naproxen, and Advil with minimal relief. Denies N/V, SOB, chest pain. Spontaneous/unlabored respirations, speaking in full sentences. Side rails up, bed in lowest position,  safety maintained.

## 2023-08-05 NOTE — ED PROVIDER NOTE - PHYSICAL EXAMINATION
NAD. VSS. Afebrile, No facial swelling. No dental tender. +Eryth gum with multiple small superficial white lesions on ant/lower gum with tender. No tongue tender or lesions. Neck supple. Lungs clear. ABD soft, non tender. Neuro- intact.

## 2023-08-06 ENCOUNTER — EMERGENCY (EMERGENCY)
Facility: HOSPITAL | Age: 33
LOS: 1 days | Discharge: HOME | End: 2023-08-06
Attending: STUDENT IN AN ORGANIZED HEALTH CARE EDUCATION/TRAINING PROGRAM
Payer: COMMERCIAL

## 2023-08-06 VITALS
HEART RATE: 74 BPM | OXYGEN SATURATION: 100 % | TEMPERATURE: 98 F | SYSTOLIC BLOOD PRESSURE: 108 MMHG | DIASTOLIC BLOOD PRESSURE: 73 MMHG | RESPIRATION RATE: 18 BRPM

## 2023-08-06 VITALS
HEART RATE: 89 BPM | TEMPERATURE: 99 F | SYSTOLIC BLOOD PRESSURE: 118 MMHG | RESPIRATION RATE: 17 BRPM | OXYGEN SATURATION: 99 % | DIASTOLIC BLOOD PRESSURE: 80 MMHG | HEIGHT: 72 IN

## 2023-08-06 DIAGNOSIS — Z98.84 BARIATRIC SURGERY STATUS: Chronic | ICD-10-CM

## 2023-08-06 DIAGNOSIS — Z98.89 OTHER SPECIFIED POSTPROCEDURAL STATES: Chronic | ICD-10-CM

## 2023-08-06 PROCEDURE — 96374 THER/PROPH/DIAG INJ IV PUSH: CPT

## 2023-08-06 PROCEDURE — 96375 TX/PRO/DX INJ NEW DRUG ADDON: CPT

## 2023-08-06 PROCEDURE — 99284 EMERGENCY DEPT VISIT MOD MDM: CPT | Mod: 25

## 2023-08-06 PROCEDURE — 99284 EMERGENCY DEPT VISIT MOD MDM: CPT

## 2023-08-06 RX ORDER — KETOROLAC TROMETHAMINE 30 MG/ML
15 SYRINGE (ML) INJECTION ONCE
Refills: 0 | Status: DISCONTINUED | OUTPATIENT
Start: 2023-08-06 | End: 2023-08-06

## 2023-08-06 RX ORDER — SODIUM CHLORIDE 9 MG/ML
1000 INJECTION INTRAMUSCULAR; INTRAVENOUS; SUBCUTANEOUS ONCE
Refills: 0 | Status: COMPLETED | OUTPATIENT
Start: 2023-08-06 | End: 2023-08-06

## 2023-08-06 RX ORDER — DIPHENHYDRAMINE HYDROCHLORIDE AND LIDOCAINE HYDROCHLORIDE AND ALUMINUM HYDROXIDE AND MAGNESIUM HYDRO
15 KIT ONCE
Refills: 0 | Status: DISCONTINUED | OUTPATIENT
Start: 2023-08-06 | End: 2023-08-10

## 2023-08-06 RX ORDER — ACETAMINOPHEN 500 MG
975 TABLET ORAL ONCE
Refills: 0 | Status: COMPLETED | OUTPATIENT
Start: 2023-08-06 | End: 2023-08-06

## 2023-08-06 RX ORDER — METOCLOPRAMIDE HCL 10 MG
10 TABLET ORAL ONCE
Refills: 0 | Status: COMPLETED | OUTPATIENT
Start: 2023-08-06 | End: 2023-08-06

## 2023-08-06 RX ORDER — LIDOCAINE 4 G/100G
1 CREAM TOPICAL ONCE
Refills: 0 | Status: COMPLETED | OUTPATIENT
Start: 2023-08-06 | End: 2023-08-06

## 2023-08-06 RX ORDER — LIDOCAINE 4 G/100G
10 CREAM TOPICAL ONCE
Refills: 0 | Status: COMPLETED | OUTPATIENT
Start: 2023-08-06 | End: 2023-08-06

## 2023-08-06 RX ADMIN — Medication 15 MILLIGRAM(S): at 18:15

## 2023-08-06 RX ADMIN — LIDOCAINE 1 PATCH: 4 CREAM TOPICAL at 18:02

## 2023-08-06 RX ADMIN — Medication 975 MILLIGRAM(S): at 18:03

## 2023-08-06 RX ADMIN — SODIUM CHLORIDE 1000 MILLILITER(S): 9 INJECTION INTRAMUSCULAR; INTRAVENOUS; SUBCUTANEOUS at 18:16

## 2023-08-06 RX ADMIN — LIDOCAINE 10 MILLILITER(S): 4 CREAM TOPICAL at 19:47

## 2023-08-06 RX ADMIN — Medication 10 MILLIGRAM(S): at 18:16
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
## 2023-08-06 NOTE — ED PROVIDER NOTE - PATIENT PORTAL LINK FT
You can access the FollowMyHealth Patient Portal offered by [facility] by registering at the following website: [email or web address]. By joining [employer]’s FollowMyHealth portal, you will also be able to view your health information using other applications (apps) compatible with our system.
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
## 2023-08-06 NOTE — ED PROVIDER NOTE - PROGRESS NOTE DETAILS
Pt states she feels improved, can tolerate PO water, does not want to finish IV fluids. d/c home with pain  management referral.

## 2023-08-06 NOTE — ED ADULT NURSE NOTE - NSFALLRISKINTERV_ED_ALL_ED
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
## 2023-08-06 NOTE — ED PROVIDER NOTE - PHYSICAL EXAMINATION
General appearance: NAD, conversant, afebrile    Eyes: anicteric sclerae, SIMON, EOMI   HENT: Atraumatic; oropharynx clear, MMM and no ulcerations, no pharyngeal erythema or exudate; no trismus   Neck: Trachea midline; Full range of motion, supple; no LAD, no mass or ttp   Pulm: CTA bl, normal respiratory effort and no intercostal retractions, normal work of breathing   CV: RRR, No murmurs, rubs, or gallops. 2+ peripheral pulses.   Abdomen: Soft, non-tender, non-distended; no guarding or rebound   Extremities: No peripheral edema or extremity lymphadenopathy. ambulatory   MSK: no midline ttp   Skin: Dry, normal temperature, turgor and texture; no rash, ulcers or subcutaneous nodules   Psych: Appropriate affect, cooperative; alert and oriented to person, place and time

## 2023-08-09 ENCOUNTER — APPOINTMENT (OUTPATIENT)
Dept: FAMILY MEDICINE | Facility: CLINIC | Age: 33
End: 2023-08-09

## 2023-08-10 ENCOUNTER — APPOINTMENT (OUTPATIENT)
Dept: FAMILY MEDICINE | Facility: CLINIC | Age: 33
End: 2023-08-10

## 2023-08-10 DIAGNOSIS — R68.84 JAW PAIN: ICD-10-CM

## 2023-08-23 ENCOUNTER — APPOINTMENT (OUTPATIENT)
Dept: FAMILY MEDICINE | Facility: CLINIC | Age: 33
End: 2023-08-23
Payer: MEDICAID

## 2023-09-07 ENCOUNTER — APPOINTMENT (OUTPATIENT)
Age: 33
End: 2023-09-07

## 2023-09-12 ENCOUNTER — APPOINTMENT (OUTPATIENT)
Dept: FAMILY MEDICINE | Facility: CLINIC | Age: 33
End: 2023-09-12
Payer: MEDICAID

## 2023-09-12 VITALS
BODY MASS INDEX: 26.41 KG/M2 | OXYGEN SATURATION: 96 % | HEART RATE: 88 BPM | HEIGHT: 72 IN | WEIGHT: 195 LBS | DIASTOLIC BLOOD PRESSURE: 68 MMHG | RESPIRATION RATE: 16 BRPM | TEMPERATURE: 98.4 F | SYSTOLIC BLOOD PRESSURE: 119 MMHG

## 2023-09-12 VITALS — BODY MASS INDEX: 24.14 KG/M2 | WEIGHT: 178 LBS

## 2023-09-12 PROCEDURE — 99214 OFFICE O/P EST MOD 30 MIN: CPT

## 2023-10-20 ENCOUNTER — NON-APPOINTMENT (OUTPATIENT)
Age: 33
End: 2023-10-20

## 2023-11-03 ENCOUNTER — APPOINTMENT (OUTPATIENT)
Dept: FAMILY MEDICINE | Facility: CLINIC | Age: 33
End: 2023-11-03
Payer: MEDICAID

## 2023-11-03 ENCOUNTER — NON-APPOINTMENT (OUTPATIENT)
Age: 33
End: 2023-11-03

## 2023-11-03 VITALS
SYSTOLIC BLOOD PRESSURE: 102 MMHG | WEIGHT: 178 LBS | BODY MASS INDEX: 24.11 KG/M2 | DIASTOLIC BLOOD PRESSURE: 62 MMHG | OXYGEN SATURATION: 99 % | TEMPERATURE: 98.7 F | HEART RATE: 87 BPM | HEIGHT: 72 IN | RESPIRATION RATE: 16 BRPM

## 2023-11-03 PROCEDURE — 99214 OFFICE O/P EST MOD 30 MIN: CPT

## 2023-11-03 RX ORDER — LIDOCAINE 5% 700 MG/1
5 PATCH TOPICAL
Qty: 1 | Refills: 2 | Status: ACTIVE | COMMUNITY
Start: 2023-11-03 | End: 1900-01-01

## 2023-11-03 RX ORDER — METHOCARBAMOL 500 MG/1
500 TABLET, FILM COATED ORAL
Refills: 0 | Status: COMPLETED | COMMUNITY
End: 2023-11-03

## 2023-11-03 RX ORDER — PENICILLIN V POTASSIUM 500 MG/1
TABLET, FILM COATED ORAL
Refills: 0 | Status: COMPLETED | COMMUNITY
End: 2023-11-03

## 2023-11-03 RX ORDER — CYCLOBENZAPRINE HYDROCHLORIDE 10 MG/1
10 TABLET, FILM COATED ORAL
Refills: 0 | Status: COMPLETED | COMMUNITY
End: 2023-11-03

## 2023-11-08 ENCOUNTER — APPOINTMENT (OUTPATIENT)
Dept: ORTHOPEDIC SURGERY | Facility: CLINIC | Age: 33
End: 2023-11-08
Payer: MEDICAID

## 2023-11-08 VITALS — HEIGHT: 72 IN

## 2023-11-08 PROCEDURE — 73030 X-RAY EXAM OF SHOULDER: CPT | Mod: RT

## 2023-11-08 PROCEDURE — 99204 OFFICE O/P NEW MOD 45 MIN: CPT

## 2023-12-04 ENCOUNTER — APPOINTMENT (OUTPATIENT)
Dept: FAMILY MEDICINE | Facility: CLINIC | Age: 33
End: 2023-12-04
Payer: MEDICAID

## 2023-12-04 ENCOUNTER — NON-APPOINTMENT (OUTPATIENT)
Age: 33
End: 2023-12-04

## 2023-12-04 VITALS
TEMPERATURE: 97.3 F | DIASTOLIC BLOOD PRESSURE: 70 MMHG | BODY MASS INDEX: 26.72 KG/M2 | SYSTOLIC BLOOD PRESSURE: 120 MMHG | RESPIRATION RATE: 16 BRPM | WEIGHT: 197 LBS | OXYGEN SATURATION: 98 % | HEART RATE: 73 BPM

## 2023-12-04 DIAGNOSIS — K59.09 OTHER CONSTIPATION: ICD-10-CM

## 2023-12-04 PROCEDURE — 99214 OFFICE O/P EST MOD 30 MIN: CPT

## 2023-12-18 ENCOUNTER — NON-APPOINTMENT (OUTPATIENT)
Age: 33
End: 2023-12-18
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
## 2023-12-19 NOTE — ASSESSMENT
[FreeTextEntry1] : VSS  c/w current medications letter written for patient  referred to orthopedics c/w current management as per pain management  follow up in 4 weeks  follow up in office if sx persists or worsens patient verbalizes understanding and is stable upon d/c

## 2024-01-18 ENCOUNTER — APPOINTMENT (OUTPATIENT)
Dept: FAMILY MEDICINE | Facility: CLINIC | Age: 34
End: 2024-01-18
Payer: MEDICAID

## 2024-01-18 ENCOUNTER — NON-APPOINTMENT (OUTPATIENT)
Age: 34
End: 2024-01-18

## 2024-01-18 VITALS
OXYGEN SATURATION: 95 % | SYSTOLIC BLOOD PRESSURE: 122 MMHG | TEMPERATURE: 98.3 F | DIASTOLIC BLOOD PRESSURE: 70 MMHG | RESPIRATION RATE: 16 BRPM | HEART RATE: 74 BPM

## 2024-01-18 DIAGNOSIS — Z12.4 ENCOUNTER FOR SCREENING FOR MALIGNANT NEOPLASM OF CERVIX: ICD-10-CM

## 2024-01-18 PROCEDURE — G2211 COMPLEX E/M VISIT ADD ON: CPT

## 2024-01-18 PROCEDURE — 99214 OFFICE O/P EST MOD 30 MIN: CPT

## 2024-01-19 PROBLEM — Z12.4 CERVICAL CANCER SCREENING: Status: ACTIVE | Noted: 2024-01-18
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
## 2024-01-19 NOTE — ASSESSMENT
[FreeTextEntry1] : VSS c/w current medications letter written for patient referred to orthopedics c/w current management as per pain management follow up in 4 weeks follow up in office if sx persists or worsens patient verbalizes understanding and is stable upon d/c.

## 2024-01-24 ENCOUNTER — NON-APPOINTMENT (OUTPATIENT)
Age: 34
End: 2024-01-24

## 2024-01-26 RX ORDER — IBUPROFEN AND FAMOTIDINE 26.6; 8 MG/1; MG/1
800-26.6 TABLET, COATED ORAL 3 TIMES DAILY
Qty: 30 | Refills: 2 | Status: ACTIVE | COMMUNITY
Start: 2024-01-26 | End: 1900-01-01

## 2024-01-30 ENCOUNTER — TRANSCRIPTION ENCOUNTER (OUTPATIENT)
Age: 34
End: 2024-01-30

## 2024-02-06 ENCOUNTER — APPOINTMENT (OUTPATIENT)
Dept: FAMILY MEDICINE | Facility: CLINIC | Age: 34
End: 2024-02-06

## 2024-02-26 ENCOUNTER — APPOINTMENT (OUTPATIENT)
Dept: FAMILY MEDICINE | Facility: CLINIC | Age: 34
End: 2024-02-26
Payer: MEDICAID

## 2024-02-26 VITALS
DIASTOLIC BLOOD PRESSURE: 80 MMHG | SYSTOLIC BLOOD PRESSURE: 130 MMHG | OXYGEN SATURATION: 98 % | HEART RATE: 113 BPM | RESPIRATION RATE: 16 BRPM | TEMPERATURE: 98 F

## 2024-02-26 PROCEDURE — 99214 OFFICE O/P EST MOD 30 MIN: CPT

## 2024-02-26 RX ORDER — VENLAFAXINE HYDROCHLORIDE 75 MG/1
75 CAPSULE, EXTENDED RELEASE ORAL
Qty: 30 | Refills: 0 | Status: COMPLETED | COMMUNITY
Start: 2023-10-06

## 2024-02-26 RX ORDER — CLINDAMYCIN HYDROCHLORIDE 150 MG/1
150 CAPSULE ORAL
Qty: 40 | Refills: 0 | Status: COMPLETED | COMMUNITY
Start: 2023-10-10

## 2024-02-26 RX ORDER — FLUCONAZOLE 150 MG/1
150 TABLET ORAL
Qty: 1 | Refills: 1 | Status: COMPLETED | COMMUNITY
Start: 2023-12-04 | End: 2024-02-26

## 2024-02-26 RX ORDER — LURASIDONE HYDROCHLORIDE 40 MG/1
40 TABLET, FILM COATED ORAL
Qty: 30 | Refills: 0 | Status: COMPLETED | COMMUNITY
Start: 2023-12-12

## 2024-02-26 RX ORDER — VITAMIN K2 90 MCG
125 MCG CAPSULE ORAL
Qty: 50 | Refills: 0 | Status: COMPLETED | COMMUNITY
Start: 2024-01-18

## 2024-02-26 RX ORDER — VENLAFAXINE HYDROCHLORIDE 37.5 MG/1
37.5 CAPSULE, EXTENDED RELEASE ORAL
Qty: 30 | Refills: 0 | Status: COMPLETED | COMMUNITY
Start: 2023-09-15

## 2024-02-26 RX ORDER — CHLORHEXIDINE GLUCONATE, 0.12% ORAL RINSE 1.2 MG/ML
0.12 SOLUTION DENTAL
Qty: 946 | Refills: 0 | Status: COMPLETED | COMMUNITY
Start: 2023-09-02

## 2024-02-26 RX ORDER — MUPIROCIN 20 MG/G
2 OINTMENT TOPICAL
Qty: 22 | Refills: 0 | Status: COMPLETED | COMMUNITY
Start: 2024-01-25

## 2024-02-26 RX ORDER — LURASIDONE HYDROCHLORIDE 20 MG/1
20 TABLET, FILM COATED ORAL
Qty: 30 | Refills: 0 | Status: COMPLETED | COMMUNITY
Start: 2024-02-06

## 2024-02-26 RX ORDER — MIRTAZAPINE 30 MG/1
30 TABLET, FILM COATED ORAL
Qty: 45 | Refills: 0 | Status: COMPLETED | COMMUNITY
Start: 2023-10-06

## 2024-02-26 RX ORDER — ACETAMINOPHEN AND CODEINE PHOSPHATE 300; 30 MG/1; MG/1
300-30 TABLET ORAL
Qty: 10 | Refills: 0 | Status: COMPLETED | COMMUNITY
Start: 2023-10-13

## 2024-02-26 RX ORDER — TRETINOIN 0.25 MG/G
0.03 CREAM TOPICAL
Qty: 45 | Refills: 0 | Status: COMPLETED | COMMUNITY
Start: 2024-01-09

## 2024-02-26 RX ORDER — IBUPROFEN 600 MG/1
600 TABLET, FILM COATED ORAL
Qty: 28 | Refills: 0 | Status: COMPLETED | COMMUNITY
Start: 2023-10-20

## 2024-02-26 RX ORDER — NYSTATIN 100000 [USP'U]/ML
100000 SUSPENSION ORAL
Qty: 1 | Refills: 1 | Status: COMPLETED | COMMUNITY
Start: 2023-12-04 | End: 2024-02-26

## 2024-02-26 RX ORDER — AMOXICILLIN 500 MG/1
500 CAPSULE ORAL
Qty: 21 | Refills: 0 | Status: COMPLETED | COMMUNITY
Start: 2024-02-15

## 2024-02-26 RX ORDER — METHYLPREDNISOLONE 4 MG/1
4 TABLET ORAL
Qty: 21 | Refills: 0 | Status: COMPLETED | COMMUNITY
Start: 2023-11-13

## 2024-02-26 RX ORDER — SODIUM FLUORIDE 6 MG/ML
1.1 PASTE DENTAL
Qty: 100 | Refills: 0 | Status: COMPLETED | COMMUNITY
Start: 2024-01-11

## 2024-02-26 RX ORDER — OXYCODONE AND ACETAMINOPHEN 5; 325 MG/1; MG/1
5-325 TABLET ORAL
Qty: 8 | Refills: 0 | Status: COMPLETED | COMMUNITY
Start: 2024-02-15

## 2024-02-26 RX ORDER — IBUPROFEN 800 MG/1
800 TABLET, FILM COATED ORAL
Refills: 0 | Status: COMPLETED | COMMUNITY
End: 2024-02-26

## 2024-02-26 RX ORDER — OXCARBAZEPINE 150 MG/1
150 TABLET, FILM COATED ORAL
Qty: 120 | Refills: 0 | Status: COMPLETED | COMMUNITY
Start: 2023-12-12

## 2024-02-26 RX ORDER — NITROFURANTOIN (MONOHYDRATE/MACROCRYSTALS) 25; 75 MG/1; MG/1
100 CAPSULE ORAL
Qty: 14 | Refills: 0 | Status: COMPLETED | COMMUNITY
Start: 2023-10-21

## 2024-02-26 RX ORDER — CLINDAMYCIN PHOSPHATE 10 MG/ML
1 LOTION TOPICAL
Qty: 60 | Refills: 0 | Status: COMPLETED | COMMUNITY
Start: 2024-01-09

## 2024-02-26 RX ORDER — CETIRIZINE HYDROCHLORIDE 10 MG/1
10 TABLET, COATED ORAL
Qty: 30 | Refills: 0 | Status: COMPLETED | COMMUNITY
Start: 2024-01-09

## 2024-02-26 RX ORDER — SODIUM FLUORIDE1.1%, POTASSIUM NITRATE 5% 5.8; 57.5 MG/ML; MG/ML
1.1-5 GEL, DENTIFRICE DENTAL
Qty: 100 | Refills: 0 | Status: COMPLETED | COMMUNITY
Start: 2023-11-04

## 2024-02-26 RX ORDER — GABAPENTIN 100 MG
100 TABLET ORAL
Refills: 0 | Status: COMPLETED | COMMUNITY
End: 2024-02-26

## 2024-02-26 RX ORDER — PREGABALIN 100 MG/1
100 CAPSULE ORAL
Qty: 60 | Refills: 0 | Status: COMPLETED | COMMUNITY
Start: 2023-11-14

## 2024-02-26 RX ORDER — VITAMIN B COMPLEX
CAPSULE ORAL
Qty: 30 | Refills: 0 | Status: COMPLETED | COMMUNITY
Start: 2024-02-10

## 2024-02-26 RX ORDER — MELOXICAM 15 MG/1
15 TABLET ORAL
Qty: 30 | Refills: 0 | Status: COMPLETED | COMMUNITY
Start: 2023-12-05

## 2024-02-26 NOTE — ASSESSMENT
[FreeTextEntry1] : VSS c/w current medications trial of duloxetine medication as prescribed, medication education done  letter written for patient referred to neurology  follow up in 4 weeks follow up in office if sx persists or worsens patient verbalizes understanding and is stable upon d/c.

## 2024-02-28 NOTE — ED PROVIDER NOTE - TOBACCO USE
HPI:   Hope Hernandez is a 37 year old female who presents for a complete physical exam. Symptoms: periods are regular. Patient complains of needing physical.. trying to do less carbs. Struggling with dinners . Has been craving foods. Mouth is dry. Nails brittle , shaving less often.  More sensitive to cold. Was in ER 2/18 for acute nephrolithiasis. She was miserable and passed a 5 mm stone. ER visit and CT reviewed . Pelviectasis noted.  Family cousin was diagnosed with familial polyposis. She is wondering if she needs to see GI for this  she does feel some tingling gurgling in left abd. Some foods will exacerbate her and she will get diarrhea - eggs, milk products. Etc.     Immunization History   Administered Date(s) Administered    Covid-19 Vaccine Pfizer 30 mcg/0.3 ml 08/28/2021, 09/26/2021    DT 12/27/2005    FLULAVAL 6 months & older 0.5 ml Prefilled syringe (01487) 11/27/2019    FLUZONE 6 months and older PFS 0.5 ml (74302) 11/27/2019, 12/10/2020, 11/22/2021    Fluvirin, 3 Years & >, Im 12/28/2009    Influenza 10/29/1999, 09/29/2018    Meningococcal (Menomune) 12/27/2005    TD 12/27/2005    TDAP 01/25/2019, 12/29/2021     Wt Readings from Last 6 Encounters:   02/28/24 223 lb 8 oz (101.4 kg)   12/29/23 220 lb (99.8 kg)   11/02/22 225 lb (102.1 kg)   05/07/22 224 lb 8 oz (101.8 kg)   03/01/22 221 lb 6 oz (100.4 kg)   11/27/19 213 lb 4 oz (96.7 kg)     Body mass index is 39.59 kg/m².     Lab Results   Component Value Date    GLU 93 03/01/2022    GLU 81 12/26/2019     Lab Results   Component Value Date    CHOLEST 208 (H) 03/01/2022    CHOLEST 210 (H) 12/26/2019     Lab Results   Component Value Date    HDL 58 03/01/2022    HDL 52 12/26/2019     Lab Results   Component Value Date     (H) 03/01/2022     (H) 12/26/2019     Lab Results   Component Value Date    AST 44 (H) 03/01/2022    AST 18 12/26/2019     Lab Results   Component Value Date     (H) 03/01/2022    ALT 26 12/26/2019       No  current outpatient medications on file.      Past Medical History:   Diagnosis Date    DM (diabetes mellitus), gestational, diet-controlled (HCC)     Polycystic ovary syndrome       No past surgical history on file.   Family History   Problem Relation Age of Onset    Hypertension Mother     Other (hypothyroid) Mother     Diabetes Father     Other (cad) Father     Other (myocardial infarct) Father     Other (hyperecholesterol) Father     No Known Problems Sister     No Known Problems Brother     Other (gastritis) Maternal Grandmother     Other (cardiomyopathy) Maternal Grandmother     Hypertension Maternal Grandfather     Heart Attack Maternal Grandfather     Heart Attack Paternal Grandmother     Diabetes Paternal Grandmother     Heart Attack Paternal Grandfather       Social History:   Social History     Socioeconomic History    Marital status:    Tobacco Use    Smoking status: Never    Smokeless tobacco: Never   Vaping Use    Vaping Use: Never used   Substance and Sexual Activity    Alcohol use: Yes     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   Exercise: walking.  Diet: low carb diet     REVIEW OF SYSTEMS:   GENERAL: feels well otherwise  SKIN: denies any unusual skin lesions  EYES:denies blurred vision or double vision  HEENT: denies nasal congestion, sinus pain or ST  LUNGS: denies shortness of breath with exertion  CARDIOVASCULAR: denies chest pain on exertion  GI: denies abdominal pain,denies heartburn, diarrhea  : denies dysuria, vaginal discharge or itching,periods regular   MUSCULOSKELETAL: denies back pain  NEURO: denies headaches  PSYCHE: denies depression or anxiety  HEMATOLOGIC: denies hx of anemia  ENDOCRINE: denies thyroid history, gest DM  ALL/ASTHMA: denies hx of allergy or asthma    EXAM:   /80   Temp 98.4 °F (36.9 °C) (Tympanic)   Ht 5' 3\" (1.6 m)   Wt 223 lb 8 oz (101.4 kg)   LMP 02/08/2024 (Exact Date)   SpO2 97%   BMI 39.59 kg/m²   Body mass index is 39.59 kg/m².   GENERAL:  well developed, well nourished,in no apparent distress, central obesity  SKIN: no rashes,no suspicious lesions  HEENT: atraumatic, normocephalic,ears and throat are clear  EYES:PERRLA, EOMI, normal optic disk,conjunctiva are clear  NECK: supple,no adenopathy,no bruits  CHEST: no chest tenderness  BREAST: no dominant or suspicious mass  LUNGS: clear to auscultation  CARDIO: RRR without murmur  GI: good BS's,no masses, HSM or tenderness  :introitus is normal,scant discharge,cervix is pink,no adnexal masses or tenderness, PAP was done   RECTAL:good rectal tone, stool is OB negative  MUSCULOSKELETAL: back is not tender,FROM of the back  EXTREMITIES: no cyanosis, clubbing or edema  NEURO: Oriented times three,cranial nerves are intact,motor and sensory are grossly intact    Bilateral barefoot skin diabetic exam is normal, visualized feet and the appearance is normal.  Bilateral monofilament/sensation of both feet is normal.  Pulsation pedal pulse exam of both lower legs/feet is normal as well.        ASSESSMENT AND PLAN:   Hope Hernandez is a 37 year old female who presents for a complete physical exam.     1. Routine adult health maintenance  - anticipatory care discussed  - diet  - sleep  - functional movement  - stress management  - CBC With Differential With Platelet; Future  - Comp Metabolic Panel; Future  - Lipid Panel; Future  - Hemoglobin A1C; Future  - TSH W Reflex To Free T4; Future    2. PCOD (polycystic ovarian disease)  - metformin discussed and  started 500 mg bid  - low glycemic diet    3. Class 2 obesity due to excess calories without serious comorbidity with body mass index (BMI) of 36.0 to 36.9 in adult  - injectables discussed  - had extensive discussion on diet types - fads and optivia for weight loss. Was 150 when      4. Hx gestational diabetes  - foot exam  - Lipid Panel; Future  - Hemoglobin A1C; Future  - TSH W Reflex To Free T4; Future  - Microalb/Creat Ratio, Random Urine [E];  Future    5. Mixed hyperlipidemia  - diet controlled  - Lipid Panel; Future    6. Macromastia  - discussed reduction surgery if intereseted - not now    7. Nephrolithiasis  - needs renal US in 1 month to assess pelviectasis      8 familial polyposis  - will refer to Gi          Pt info handouts given for: exercise, low fat diet and breast self-exam. Pt' s weight is Body mass index is 39.59 kg/m²., recommended low fat diet and aerobic exercise 30 minutes three times weekly.  The patient indicates understanding of these issues and agrees to the plan.  The patient is asked to return for CPX in 1 year.     Unknown if ever smoked

## 2024-02-29 NOTE — ED ADULT NURSE NOTE - SUICIDE SCREENING QUESTION 3
"Preventive Care Visit  [facility]  Jamil Turner MD, Pediatrics  2024    Assessment & Plan   2 month old, here for preventive care.    (Z00.129) Encounter for routine child health examination w/o abnormal findings  (primary encounter diagnosis)  Comment: Normal growth and development.  Will administer 2-month immunizations today.    Plan: Maternal Health Risk Assessment (50711) - EPDS,        DTAP/IPV/HIB/HEPB 6W-4Y (VAXELIS), ROTAVIRUS,         PENTAVALENT 3-DOSE (ROTATEQ), PRIMARY CARE         FOLLOW-UP SCHEDULING, PNEUMOCOCCAL 20 VALENT         CONJUGATE (PREVNAR 20)            Growth      Weight change since birth: 134%  Normal OFC, length and weight    Immunizations   Vaccines up to date.  Appropriate vaccinations were ordered.    Anticipatory Guidance    Reviewed age appropriate anticipatory guidance.   The following topics were discussed:  SOCIAL/ FAMILY    return to work    crying/ fussiness    calming techniques    talk or sing to baby/ music  NUTRITION:    delay solid food    no honey before one year    vit D if breastfeeding  HEALTH/ SAFETY:    fevers    skin care    spitting up    car seat    Referrals/Ongoing Specialty Care  None      Subjective   Pito is presenting for the following:  Well Child        2024    10:03 AM   Additional Questions   Accompanied by mom   Questions for today's visit Yes   Questions stool  may be hard once a day   Surgery, major illness, or injury since last physical No     Birth History    Birth History    Birth     Length: 1' 8\" (50.8 cm)     Weight: 6 lb 5 oz (2.863 kg)     HC 13\" (33 cm)    Apgar     One: 8     Five: 9    Discharge Weight: 6 lb 1.2 oz (2.756 kg)    Delivery Method: Vaginal, Spontaneous    Gestation Age: 38 4/7 wks    Duration of Labor: 1st: 2h 50m / 2nd: 5m    Days in Hospital: 1.0    Hospital [facility]: [facility]    Hospital Location: [place], [place]     Immunization History "   Administered Date(s) Administered    Hepatitis B, Peds 2023     Hepatitis B # 1 given in nursery: yes   metabolic screening: All components normal   hearing screen: Passed--data reviewed      Hearing Screen:   Hearing Screen, Right Ear: passed        Hearing Screen, Left Ear: passed           CCHD Screen:   Right upper extremity -    Right Hand (%): 98 %     Lower extremity -    Foot (%): 99 %     CCHD Interpretation -   Critical Congenital Heart Screen Result: pass       [place]  Depression Scale (EPDS) Risk Assessment: Completed [place]        2024   Social   Lives with Parent(s)    Sibling(s)   Who takes care of your child? Parent(s)    Grandparent(s)   Recent potential stressors None   History of trauma No   Family Hx mental health challenges (!) YES   Lack of transportation has limited access to appts/meds No   Do you have housing?  Yes   Are you worried about losing your housing? Yes   (!) HOUSING CONCERN PRESENT      2024     9:55 AM   Health Risks/Safety   What type of car seat does your child use?  Infant car seat   Is your child's car seat forward or rear facing? Rear facing   Where does your child sit in the car?  Back seat            2024     9:55 AM   TB Screening: Consider immunosuppression as a risk factor for TB   Recent TB infection or positive TB test in family/close contacts No          2024   Diet   Questions about feeding? No   What does your baby eat?  Breast milk   How does your baby eat? Breastfeeding / Nursing   How often does your baby eat? (From the start of one feed to start of the next feed) 3hours   Vitamin or supplement use Vitamin D   In past 12 months, concerned food might run out No   In past 12 months, food has run out/couldn't afford more No         2024     9:55 AM   Elimination   Bowel or bladder concerns? (!) CONSTIPATION (HARD OR INFREQUENT POOP)         2024     9:55 AM   Sleep   Where does your baby  "sleep? Crib    [place]   In what position does your baby sleep? Back   How many times does your child wake in the night?  3         2/29/2024     9:55 AM   Vision/Hearing   Vision or hearing concerns No concerns         2/29/2024     9:55 AM   Development/ Social-Emotional Screen   Developmental concerns No   Does your child receive any special services? No     Development     Screening too used, reviewed with parent or guardian: No screening tool used  Milestones (by observation/ exam/ report) 75-90% ile  SOCIAL/EMOTIONAL:   Looks at your face   Smiles when you talk to or smile at your child   Seems happy to see you when you walk up to your child   Calms down when spoken to or picked up  LANGUAGE/COMMUNICATION:   Makes sounds other than crying   Reacts to loud sounds  COGNITIVE (LEARNING, THINKING, PROBLEM-SOLVING):   Watches as you move   Looks at a toy for several seconds  MOVEMENT/PHYSICAL DEVELOPMENT:   Opens hands briefly   Holds head up when on tummy   Moves both arms and both legs         Objective     Exam  Temp 99.2  F (37.3  C) (Rectal)   Ht 2' 0.61\" (0.625 m)   Wt 14 lb 12 oz (6.691 kg)   HC 16.14\" (41 cm)   BMI 17.13 kg/m    88 %ile (Z= 1.20) based on WHO (Boys, 0-2 years) head circumference-for-age based on Head Circumference recorded on 2/29/2024.  87 %ile (Z= 1.14) based on WHO (Boys, 0-2 years) weight-for-age data using vitals from 2/29/2024.  94 %ile (Z= 1.53) based on WHO (Boys, 0-2 years) Length-for-age data based on Length recorded on 2/29/2024.  53 %ile (Z= 0.07) based on WHO (Boys, 0-2 years) weight-for-recumbent length data based on body measurements available as of 2/29/2024.    Physical Exam  GENERAL: Active, alert, in no acute distress.  SKIN: Clear. No significant rash, abnormal pigmentation or lesions  HEAD: Normocephalic. Normal fontanels and sutures.  EYES: Conjunctivae and cornea normal. Red reflexes present bilaterally.  EARS: Normal canals. Tympanic membranes are normal; gray " and translucent.  NOSE: Normal without discharge.  MOUTH/THROAT: Clear. No oral lesions.  NECK: Supple, no masses.  LYMPH NODES: No adenopathy  LUNGS: Clear. No rales, rhonchi, wheezing or retractions  HEART: Regular rhythm. Normal S1/S2. No murmurs. Normal femoral pulses.  ABDOMEN: Soft, non-tender, not distended, no masses or hepatosplenomegaly. Normal umbilicus and bowel sounds.   GENITALIA: Normal male external genitalia. Atilio stage I,  Testes descended bilaterally, no hernia or hydrocele.    EXTREMITIES: Hips normal with negative Ortolani and Guzman. Symmetric creases and  no deformities  NEUROLOGIC: Normal tone throughout. Normal reflexes for age    Prior to immunization administration, verified patients identity using patient s name and date of birth. Please see Immunization Activity for additional information.     Screening Questionnaire for Pediatric Immunization    Is the child sick today?   No   Does the child have allergies to medications, food, a vaccine component, or latex?   No   Has the child had a serious reaction to a vaccine in the past?   No   Does the child have a long-term health problem with lung, heart, kidney or metabolic disease (e.g., diabetes), asthma, a blood disorder, no spleen, complement component deficiency, a cochlear implant, or a spinal fluid leak?  Is he/she on long-term aspirin therapy?   No   If the child to be vaccinated is 2 through 4 years of age, has a healthcare provider told you that the child had wheezing or asthma in the  past 12 months?   No   If your child is a baby, have you ever been told he or she has had intussusception?   No   Has the child, sibling or parent had a seizure, has the child had brain or other nervous system problems?   No   Does the child have cancer, leukemia, AIDS, or any immune system         problem?   No   Does the child have a parent, brother, or sister with an immune system problem?   No   In the past 3 months, has the child taken  medications that affect the immune system such as prednisone, other steroids, or anticancer drugs; drugs for the treatment of rheumatoid arthritis, Crohn s disease, or psoriasis; or had radiation treatments?   No   In the past year, has the child received a transfusion of blood or blood products, or been given immune (gamma) globulin or an antiviral drug?   No   Is the child/teen pregnant or is there a chance that she could become       pregnant during the next month?   No   Has the child received any vaccinations in the past 4 weeks?   No               Immunization questionnaire answers were all negative.      Patient instructed to remain in clinic for 15 minutes afterwards, and to report any adverse reactions.     Screening performed by Felicia Anders on 2/29/2024 at 10:10 AM.    Signed Electronically by: Jamil Turner MD     No

## 2024-03-06 RX ORDER — PREGABALIN 100 MG/1
100 CAPSULE ORAL 3 TIMES DAILY
Qty: 90 | Refills: 1 | Status: ACTIVE | COMMUNITY
Start: 2024-03-06 | End: 1900-01-01

## 2024-03-19 ENCOUNTER — TRANSCRIPTION ENCOUNTER (OUTPATIENT)
Age: 34
End: 2024-03-19

## 2024-03-26 ENCOUNTER — APPOINTMENT (OUTPATIENT)
Dept: OBGYN | Facility: CLINIC | Age: 34
End: 2024-03-26
Payer: MEDICAID

## 2024-03-26 VITALS
SYSTOLIC BLOOD PRESSURE: 109 MMHG | HEIGHT: 72 IN | WEIGHT: 209 LBS | BODY MASS INDEX: 28.31 KG/M2 | HEART RATE: 76 BPM | DIASTOLIC BLOOD PRESSURE: 74 MMHG

## 2024-03-26 DIAGNOSIS — Z01.419 ENCOUNTER FOR GYNECOLOGICAL EXAMINATION (GENERAL) (ROUTINE) W/OUT ABNORMAL FINDINGS: ICD-10-CM

## 2024-03-26 DIAGNOSIS — Z80.42 FAMILY HISTORY OF MALIGNANT NEOPLASM OF PROSTATE: ICD-10-CM

## 2024-03-26 DIAGNOSIS — Z78.9 OTHER SPECIFIED HEALTH STATUS: ICD-10-CM

## 2024-03-26 DIAGNOSIS — Z11.3 ENCOUNTER FOR SCREENING FOR INFECTIONS WITH A PREDOMINANTLY SEXUAL MODE OF TRANSMISSION: ICD-10-CM

## 2024-03-26 PROCEDURE — 99385 PREV VISIT NEW AGE 18-39: CPT

## 2024-03-26 RX ORDER — DULOXETINE HYDROCHLORIDE 20 MG/1
20 CAPSULE, DELAYED RELEASE PELLETS ORAL
Qty: 30 | Refills: 2 | Status: DISCONTINUED | COMMUNITY
Start: 2024-02-26 | End: 2024-03-26

## 2024-03-26 NOTE — PHYSICAL EXAM
[Chaperone Present] : A chaperone was present in the examining room during all aspects of the physical examination [FreeTextEntry1] : Soha Ha [Appropriately responsive] : appropriately responsive [Alert] : alert [No Acute Distress] : no acute distress [No Lymphadenopathy] : no lymphadenopathy [Soft] : soft [Non-tender] : non-tender [Non-distended] : non-distended [No HSM] : No HSM [No Lesions] : no lesions [No Mass] : no mass [Oriented x3] : oriented x3 [Examination Of The Breasts] : a normal appearance [No Masses] : no breast masses were palpable [Labia Majora] : normal [Labia Minora] : normal [Normal] : normal [Uterine Adnexae] : normal
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
## 2024-03-27 ENCOUNTER — TRANSCRIPTION ENCOUNTER (OUTPATIENT)
Age: 34
End: 2024-03-27

## 2024-03-27 LAB
C TRACH RRNA SPEC QL NAA+PROBE: NOT DETECTED
HBV SURFACE AG SER QL: NONREACTIVE
HCV AB SER QL: NONREACTIVE
HCV S/CO RATIO: 0.14 S/CO
HIV1+2 AB SPEC QL IA.RAPID: NONREACTIVE
HPV HIGH+LOW RISK DNA PNL CVX: NOT DETECTED
N GONORRHOEA RRNA SPEC QL NAA+PROBE: NOT DETECTED
SOURCE TP AMPLIFICATION: NORMAL
T PALLIDUM AB SER QL IA: NEGATIVE

## 2024-03-30 LAB — CYTOLOGY CVX/VAG DOC THIN PREP: NORMAL
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
## 2024-04-18 ENCOUNTER — APPOINTMENT (OUTPATIENT)
Dept: FAMILY MEDICINE | Facility: CLINIC | Age: 34
End: 2024-04-18

## 2024-05-15 ENCOUNTER — APPOINTMENT (OUTPATIENT)
Dept: FAMILY MEDICINE | Facility: CLINIC | Age: 34
End: 2024-05-15
Payer: MEDICAID

## 2024-05-15 VITALS
BODY MASS INDEX: 28.85 KG/M2 | DIASTOLIC BLOOD PRESSURE: 80 MMHG | OXYGEN SATURATION: 97 % | HEART RATE: 83 BPM | TEMPERATURE: 97.8 F | RESPIRATION RATE: 16 BRPM | HEIGHT: 72 IN | SYSTOLIC BLOOD PRESSURE: 115 MMHG | WEIGHT: 213 LBS

## 2024-05-15 PROCEDURE — 99214 OFFICE O/P EST MOD 30 MIN: CPT

## 2024-05-15 RX ORDER — LURASIDONE HYDROCHLORIDE 60 MG/1
TABLET, FILM COATED ORAL
Refills: 0 | Status: COMPLETED | COMMUNITY
End: 2024-05-15

## 2024-05-15 RX ORDER — PANTOPRAZOLE 40 MG/1
40 TABLET, DELAYED RELEASE ORAL
Qty: 14 | Refills: 0 | Status: COMPLETED | COMMUNITY
Start: 2023-06-09 | End: 2024-05-15

## 2024-05-15 RX ORDER — IBUPROFEN 800 MG/1
800 TABLET, FILM COATED ORAL 3 TIMES DAILY
Qty: 1 | Refills: 2 | Status: COMPLETED | COMMUNITY
Start: 2023-11-03 | End: 2024-05-15

## 2024-05-15 RX ORDER — NICOTINE 21 MG/24HR
21 PATCH, TRANSDERMAL 24 HOURS TRANSDERMAL
Refills: 0 | Status: COMPLETED | COMMUNITY
End: 2024-05-15

## 2024-05-15 RX ORDER — PHENOL 1.4 %
600 AEROSOL, SPRAY (ML) MUCOUS MEMBRANE DAILY
Qty: 90 | Refills: 1 | Status: ACTIVE | COMMUNITY
Start: 2024-05-15 | End: 1900-01-01

## 2024-05-15 RX ORDER — PREGABALIN 100 MG/1
100 CAPSULE ORAL
Refills: 0 | Status: COMPLETED | COMMUNITY
End: 2024-05-15

## 2024-05-15 RX ORDER — BUPROPION HYDROCHLORIDE 300 MG/1
300 TABLET, EXTENDED RELEASE ORAL
Refills: 0 | Status: COMPLETED | COMMUNITY
End: 2024-05-15

## 2024-05-20 NOTE — HISTORY OF PRESENT ILLNESS
[FreeTextEntry8] : 33 yo f, pmhx of anxiety and depression, history of opioid abuse (on methadone), s/p sleeve gastrectomy, for follow up today  c/o jaw pain, numbness and tingling in tongue  now subsiding  now is going seeing Dr. Shaikh, neurologist trigeminal neuralgia- on a new medication which is helping  still experiencing back and neck pain  was doing PT which stopped stopped seeing pain management  looking for a new job  seeing bridging access to care in [place] for psychiatry  methadone- Dr. Cedrick Sharma          psychiatrist considering possible diagnosis of BPD- possibly changing meds and adding latuda        denies any other associated symptoms denies any other complaints or concerns at this time

## 2024-05-20 NOTE — ASSESSMENT
[FreeTextEntry1] : VSS c/w current medications medication as prescribed, medication education done  letter written for patient advised to schedule CPE follow up in office if sx persists or worsens patient verbalizes understanding and is stable upon d/c.

## 2024-05-23 RX ORDER — GABAPENTIN 300 MG/1
300 CAPSULE ORAL
Qty: 18 | Refills: 0 | Status: ACTIVE | COMMUNITY
Start: 2023-11-03 | End: 1900-01-01

## 2024-06-06 ENCOUNTER — INPATIENT (INPATIENT)
Facility: HOSPITAL | Age: 34
LOS: 3 days | Discharge: HOME | DRG: 897 | End: 2024-06-10
Attending: HOSPITALIST | Admitting: STUDENT IN AN ORGANIZED HEALTH CARE EDUCATION/TRAINING PROGRAM
Payer: MEDICAID

## 2024-06-06 VITALS
TEMPERATURE: 98 F | SYSTOLIC BLOOD PRESSURE: 130 MMHG | RESPIRATION RATE: 20 BRPM | OXYGEN SATURATION: 97 % | HEART RATE: 117 BPM | DIASTOLIC BLOOD PRESSURE: 65 MMHG

## 2024-06-06 DIAGNOSIS — E83.39 OTHER DISORDERS OF PHOSPHORUS METABOLISM: ICD-10-CM

## 2024-06-06 DIAGNOSIS — F11.90 OPIOID USE, UNSPECIFIED, UNCOMPLICATED: ICD-10-CM

## 2024-06-06 DIAGNOSIS — R33.9 RETENTION OF URINE, UNSPECIFIED: ICD-10-CM

## 2024-06-06 DIAGNOSIS — F10.939 ALCOHOL USE, UNSPECIFIED WITH WITHDRAWAL, UNSPECIFIED: ICD-10-CM

## 2024-06-06 DIAGNOSIS — Z98.89 OTHER SPECIFIED POSTPROCEDURAL STATES: Chronic | ICD-10-CM

## 2024-06-06 DIAGNOSIS — G50.0 TRIGEMINAL NEURALGIA: ICD-10-CM

## 2024-06-06 DIAGNOSIS — Z98.84 BARIATRIC SURGERY STATUS: Chronic | ICD-10-CM

## 2024-06-06 DIAGNOSIS — F41.9 ANXIETY DISORDER, UNSPECIFIED: ICD-10-CM

## 2024-06-06 DIAGNOSIS — F10.239 ALCOHOL DEPENDENCE WITH WITHDRAWAL, UNSPECIFIED: ICD-10-CM

## 2024-06-06 DIAGNOSIS — G47.00 INSOMNIA, UNSPECIFIED: ICD-10-CM

## 2024-06-06 DIAGNOSIS — Z29.9 ENCOUNTER FOR PROPHYLACTIC MEASURES, UNSPECIFIED: ICD-10-CM

## 2024-06-06 LAB
ALBUMIN SERPL ELPH-MCNC: 4.2 G/DL — SIGNIFICANT CHANGE UP (ref 3.3–5)
ALP SERPL-CCNC: 66 U/L — SIGNIFICANT CHANGE UP (ref 40–120)
ALT FLD-CCNC: 14 U/L — SIGNIFICANT CHANGE UP (ref 10–45)
AMPHET UR-MCNC: NEGATIVE — SIGNIFICANT CHANGE UP
ANION GAP SERPL CALC-SCNC: 19 MMOL/L — HIGH (ref 5–17)
APPEARANCE UR: CLEAR — SIGNIFICANT CHANGE UP
AST SERPL-CCNC: 20 U/L — SIGNIFICANT CHANGE UP (ref 10–40)
BACTERIA # UR AUTO: NEGATIVE /HPF — SIGNIFICANT CHANGE UP
BARBITURATES UR SCN-MCNC: NEGATIVE — SIGNIFICANT CHANGE UP
BASOPHILS # BLD AUTO: 0.04 K/UL — SIGNIFICANT CHANGE UP (ref 0–0.2)
BASOPHILS NFR BLD AUTO: 0.7 % — SIGNIFICANT CHANGE UP (ref 0–2)
BENZODIAZ UR-MCNC: NEGATIVE — SIGNIFICANT CHANGE UP
BILIRUB DIRECT SERPL-MCNC: <0.1 MG/DL — SIGNIFICANT CHANGE UP (ref 0–0.3)
BILIRUB INDIRECT FLD-MCNC: >0.1 MG/DL — LOW (ref 0.2–1)
BILIRUB SERPL-MCNC: 0.2 MG/DL — SIGNIFICANT CHANGE UP (ref 0.2–1.2)
BILIRUB UR-MCNC: NEGATIVE — SIGNIFICANT CHANGE UP
BUN SERPL-MCNC: 15 MG/DL — SIGNIFICANT CHANGE UP (ref 7–23)
CALCIUM SERPL-MCNC: 10 MG/DL — SIGNIFICANT CHANGE UP (ref 8.4–10.5)
CAST: 0 /LPF — SIGNIFICANT CHANGE UP (ref 0–4)
CHLORIDE SERPL-SCNC: 106 MMOL/L — SIGNIFICANT CHANGE UP (ref 96–108)
CO2 SERPL-SCNC: 17 MMOL/L — LOW (ref 22–31)
COCAINE METAB.OTHER UR-MCNC: NEGATIVE — SIGNIFICANT CHANGE UP
COLOR SPEC: YELLOW — SIGNIFICANT CHANGE UP
CREAT SERPL-MCNC: 0.79 MG/DL — SIGNIFICANT CHANGE UP (ref 0.5–1.3)
DIFF PNL FLD: NEGATIVE — SIGNIFICANT CHANGE UP
EGFR: 99 ML/MIN/1.73M2 — SIGNIFICANT CHANGE UP
EOSINOPHIL # BLD AUTO: 0.12 K/UL — SIGNIFICANT CHANGE UP (ref 0–0.5)
EOSINOPHIL NFR BLD AUTO: 2 % — SIGNIFICANT CHANGE UP (ref 0–6)
ETHANOL SERPL-MCNC: 18 MG/DL — HIGH (ref 0–10)
GLUCOSE SERPL-MCNC: 93 MG/DL — SIGNIFICANT CHANGE UP (ref 70–99)
GLUCOSE UR QL: NEGATIVE MG/DL — SIGNIFICANT CHANGE UP
HCT VFR BLD CALC: 33.8 % — LOW (ref 34.5–45)
HGB BLD-MCNC: 10.9 G/DL — LOW (ref 11.5–15.5)
IMM GRANULOCYTES NFR BLD AUTO: 0.3 % — SIGNIFICANT CHANGE UP (ref 0–0.9)
KETONES UR-MCNC: NEGATIVE MG/DL — SIGNIFICANT CHANGE UP
LEUKOCYTE ESTERASE UR-ACNC: NEGATIVE — SIGNIFICANT CHANGE UP
LIDOCAIN IGE QN: 15 U/L — SIGNIFICANT CHANGE UP (ref 7–60)
LYMPHOCYTES # BLD AUTO: 2.28 K/UL — SIGNIFICANT CHANGE UP (ref 1–3.3)
LYMPHOCYTES # BLD AUTO: 38.2 % — SIGNIFICANT CHANGE UP (ref 13–44)
MAGNESIUM SERPL-MCNC: 1.9 MG/DL — SIGNIFICANT CHANGE UP (ref 1.6–2.6)
MCHC RBC-ENTMCNC: 28.2 PG — SIGNIFICANT CHANGE UP (ref 27–34)
MCHC RBC-ENTMCNC: 32.2 GM/DL — SIGNIFICANT CHANGE UP (ref 32–36)
MCV RBC AUTO: 87.3 FL — SIGNIFICANT CHANGE UP (ref 80–100)
METHADONE UR-MCNC: POSITIVE
MONOCYTES # BLD AUTO: 0.79 K/UL — SIGNIFICANT CHANGE UP (ref 0–0.9)
MONOCYTES NFR BLD AUTO: 13.2 % — SIGNIFICANT CHANGE UP (ref 2–14)
NEUTROPHILS # BLD AUTO: 2.72 K/UL — SIGNIFICANT CHANGE UP (ref 1.8–7.4)
NEUTROPHILS NFR BLD AUTO: 45.6 % — SIGNIFICANT CHANGE UP (ref 43–77)
NITRITE UR-MCNC: NEGATIVE — SIGNIFICANT CHANGE UP
NRBC # BLD: 0 /100 WBCS — SIGNIFICANT CHANGE UP (ref 0–0)
OPIATES UR-MCNC: NEGATIVE — SIGNIFICANT CHANGE UP
OXYCODONE UR-MCNC: NEGATIVE — SIGNIFICANT CHANGE UP
PCP SPEC-MCNC: SIGNIFICANT CHANGE UP
PCP UR-MCNC: NEGATIVE — SIGNIFICANT CHANGE UP
PH UR: 8 — SIGNIFICANT CHANGE UP (ref 5–8)
PHOSPHATE SERPL-MCNC: 1.4 MG/DL — LOW (ref 2.5–4.5)
PLATELET # BLD AUTO: 507 K/UL — HIGH (ref 150–400)
POTASSIUM SERPL-MCNC: 3.8 MMOL/L — SIGNIFICANT CHANGE UP (ref 3.5–5.3)
POTASSIUM SERPL-SCNC: 3.8 MMOL/L — SIGNIFICANT CHANGE UP (ref 3.5–5.3)
PROT SERPL-MCNC: 8.4 G/DL — HIGH (ref 6–8.3)
PROT UR-MCNC: NEGATIVE MG/DL — SIGNIFICANT CHANGE UP
RBC # BLD: 3.87 M/UL — SIGNIFICANT CHANGE UP (ref 3.8–5.2)
RBC # FLD: 14.7 % — HIGH (ref 10.3–14.5)
RBC CASTS # UR COMP ASSIST: 1 /HPF — SIGNIFICANT CHANGE UP (ref 0–4)
SODIUM SERPL-SCNC: 142 MMOL/L — SIGNIFICANT CHANGE UP (ref 135–145)
SP GR SPEC: 1.02 — SIGNIFICANT CHANGE UP (ref 1–1.03)
SQUAMOUS # UR AUTO: 3 /HPF — SIGNIFICANT CHANGE UP (ref 0–5)
THC UR QL: POSITIVE
TROPONIN T, HIGH SENSITIVITY RESULT: <6 NG/L — SIGNIFICANT CHANGE UP (ref 0–51)
UROBILINOGEN FLD QL: 1 MG/DL — SIGNIFICANT CHANGE UP (ref 0.2–1)
WBC # BLD: 5.97 K/UL — SIGNIFICANT CHANGE UP (ref 3.8–10.5)
WBC # FLD AUTO: 5.97 K/UL — SIGNIFICANT CHANGE UP (ref 3.8–10.5)
WBC UR QL: 0 /HPF — SIGNIFICANT CHANGE UP (ref 0–5)

## 2024-06-06 PROCEDURE — 99285 EMERGENCY DEPT VISIT HI MDM: CPT

## 2024-06-06 PROCEDURE — 99223 1ST HOSP IP/OBS HIGH 75: CPT

## 2024-06-06 RX ORDER — METHADONE HYDROCHLORIDE 40 MG/1
16 TABLET ORAL DAILY
Refills: 0 | Status: DISCONTINUED | OUTPATIENT
Start: 2024-06-07 | End: 2024-06-10

## 2024-06-06 RX ORDER — DIAZEPAM 5 MG
5 TABLET ORAL ONCE
Refills: 0 | Status: DISCONTINUED | OUTPATIENT
Start: 2024-06-06 | End: 2024-06-06

## 2024-06-06 RX ORDER — MIRTAZAPINE 45 MG/1
30 TABLET, ORALLY DISINTEGRATING ORAL AT BEDTIME
Refills: 0 | Status: DISCONTINUED | OUTPATIENT
Start: 2024-06-06 | End: 2024-06-07

## 2024-06-06 RX ORDER — THIAMINE MONONITRATE (VIT B1) 100 MG
100 TABLET ORAL ONCE
Refills: 0 | Status: COMPLETED | OUTPATIENT
Start: 2024-06-06 | End: 2024-06-06

## 2024-06-06 RX ORDER — NICOTINE POLACRILEX 2 MG
1 GUM BUCCAL DAILY
Refills: 0 | Status: DISCONTINUED | OUTPATIENT
Start: 2024-06-06 | End: 2024-06-10

## 2024-06-06 RX ORDER — METHADONE HYDROCHLORIDE 40 MG/1
0 TABLET ORAL
Refills: 0 | DISCHARGE

## 2024-06-06 RX ORDER — SODIUM CHLORIDE 9 MG/ML
1000 INJECTION INTRAMUSCULAR; INTRAVENOUS; SUBCUTANEOUS
Refills: 0 | Status: DISCONTINUED | OUTPATIENT
Start: 2024-06-06 | End: 2024-06-06

## 2024-06-06 RX ORDER — ACETAMINOPHEN 500 MG
650 TABLET ORAL EVERY 6 HOURS
Refills: 0 | Status: DISCONTINUED | OUTPATIENT
Start: 2024-06-06 | End: 2024-06-10

## 2024-06-06 RX ORDER — ONDANSETRON 8 MG/1
4 TABLET, FILM COATED ORAL EVERY 8 HOURS
Refills: 0 | Status: DISCONTINUED | OUTPATIENT
Start: 2024-06-06 | End: 2024-06-10

## 2024-06-06 RX ORDER — METHADONE HYDROCHLORIDE 40 MG/1
16 TABLET ORAL
Refills: 0 | DISCHARGE

## 2024-06-06 RX ORDER — LANOLIN ALCOHOL/MO/W.PET/CERES
3 CREAM (GRAM) TOPICAL AT BEDTIME
Refills: 0 | Status: DISCONTINUED | OUTPATIENT
Start: 2024-06-06 | End: 2024-06-10

## 2024-06-06 RX ORDER — SODIUM CHLORIDE 9 MG/ML
1000 INJECTION, SOLUTION INTRAVENOUS
Refills: 0 | Status: DISCONTINUED | OUTPATIENT
Start: 2024-06-06 | End: 2024-06-06

## 2024-06-06 RX ORDER — DIAZEPAM 5 MG
10 TABLET ORAL
Refills: 0 | Status: DISCONTINUED | OUTPATIENT
Start: 2024-06-06 | End: 2024-06-06

## 2024-06-06 RX ORDER — GABAPENTIN 400 MG/1
300 CAPSULE ORAL THREE TIMES A DAY
Refills: 0 | Status: DISCONTINUED | OUTPATIENT
Start: 2024-06-06 | End: 2024-06-10

## 2024-06-06 RX ORDER — DIAZEPAM 5 MG
10 TABLET ORAL ONCE
Refills: 0 | Status: DISCONTINUED | OUTPATIENT
Start: 2024-06-06 | End: 2024-06-06

## 2024-06-06 RX ORDER — METHADONE HYDROCHLORIDE 40 MG/1
16 TABLET ORAL ONCE
Refills: 0 | Status: DISCONTINUED | OUTPATIENT
Start: 2024-06-06 | End: 2024-06-06

## 2024-06-06 RX ORDER — FOLIC ACID 0.8 MG
1 TABLET ORAL DAILY
Refills: 0 | Status: DISCONTINUED | OUTPATIENT
Start: 2024-06-06 | End: 2024-06-10

## 2024-06-06 RX ORDER — LAMOTRIGINE 25 MG/1
25 TABLET, ORALLY DISINTEGRATING ORAL DAILY
Refills: 0 | Status: DISCONTINUED | OUTPATIENT
Start: 2024-06-06 | End: 2024-06-10

## 2024-06-06 RX ORDER — GABAPENTIN 400 MG/1
1 CAPSULE ORAL
Refills: 0 | DISCHARGE

## 2024-06-06 RX ORDER — POTASSIUM PHOSPHATE, MONOBASIC POTASSIUM PHOSPHATE, DIBASIC 236; 224 MG/ML; MG/ML
30 INJECTION, SOLUTION INTRAVENOUS ONCE
Refills: 0 | Status: COMPLETED | OUTPATIENT
Start: 2024-06-06 | End: 2024-06-06

## 2024-06-06 RX ADMIN — GABAPENTIN 300 MILLIGRAM(S): 400 CAPSULE ORAL at 14:00

## 2024-06-06 RX ADMIN — Medication 0.3 MILLIGRAM(S): at 22:07

## 2024-06-06 RX ADMIN — Medication 10 MILLIGRAM(S): at 08:07

## 2024-06-06 RX ADMIN — LAMOTRIGINE 25 MILLIGRAM(S): 25 TABLET, ORALLY DISINTEGRATING ORAL at 14:00

## 2024-06-06 RX ADMIN — Medication 1 MILLIGRAM(S): at 11:36

## 2024-06-06 RX ADMIN — Medication 1 TABLET(S): at 11:35

## 2024-06-06 RX ADMIN — Medication 2 MILLIGRAM(S): at 14:27

## 2024-06-06 RX ADMIN — Medication 4 MILLIGRAM(S): at 12:20

## 2024-06-06 RX ADMIN — Medication 2 MILLIGRAM(S): at 11:36

## 2024-06-06 RX ADMIN — Medication 1 PATCH: at 12:19

## 2024-06-06 RX ADMIN — Medication 10 MILLIGRAM(S): at 09:50

## 2024-06-06 RX ADMIN — Medication 4 MILLIGRAM(S): at 17:32

## 2024-06-06 RX ADMIN — SODIUM CHLORIDE 100 MILLILITER(S): 9 INJECTION INTRAMUSCULAR; INTRAVENOUS; SUBCUTANEOUS at 11:36

## 2024-06-06 RX ADMIN — Medication 1 PATCH: at 22:10

## 2024-06-06 RX ADMIN — METHADONE HYDROCHLORIDE 16 MILLIGRAM(S): 40 TABLET ORAL at 11:57

## 2024-06-06 RX ADMIN — Medication 100 MILLIGRAM(S): at 08:19

## 2024-06-06 RX ADMIN — POTASSIUM PHOSPHATE, MONOBASIC POTASSIUM PHOSPHATE, DIBASIC 83.33 MILLIMOLE(S): 236; 224 INJECTION, SOLUTION INTRAVENOUS at 11:55

## 2024-06-06 RX ADMIN — SODIUM CHLORIDE 100 MILLILITER(S): 9 INJECTION INTRAMUSCULAR; INTRAVENOUS; SUBCUTANEOUS at 08:07

## 2024-06-06 RX ADMIN — GABAPENTIN 300 MILLIGRAM(S): 400 CAPSULE ORAL at 21:32

## 2024-06-06 RX ADMIN — Medication 4 MILLIGRAM(S): at 21:31

## 2024-06-06 RX ADMIN — MIRTAZAPINE 30 MILLIGRAM(S): 45 TABLET, ORALLY DISINTEGRATING ORAL at 22:07

## 2024-06-06 NOTE — ED PROVIDER NOTE - OBJECTIVE STATEMENT
34F with past medical history of polysubstance use disorder, trigeminal neuralgia, presents to emergency department due to uncontrollable tremors since yesterday. She last drank EtOH yesterday evening, says it was "a lot of wine." She has had alcohol withdrawal symptoms before. She denies any chest pain or shortness of breath. She states "I might have seen things that are not there."

## 2024-06-06 NOTE — ED ADULT NURSE REASSESSMENT NOTE - NS ED NURSE REASSESS COMMENT FT1
Patient remains stable while in the ED, VS WDL. Due meds given as ordered and tolerated well. Admitted to medicine for Alcohol withdrawal sx pending bed availability.

## 2024-06-06 NOTE — ED ADULT NURSE NOTE - NSICDXPASTMEDICALHX_GEN_ALL_CORE_FT
PAST MEDICAL HISTORY:  Moderate alcohol use disorder     Morbid obesity     Oxycodone use disorder, mild, abuse     Trigeminal neuralgia

## 2024-06-06 NOTE — H&P ADULT - NSHPPHYSICALEXAM_GEN_ALL_CORE
Vital Signs Last 24 Hrs  T(C): 37.3 (06 Jun 2024 11:30), Max: 37.3 (06 Jun 2024 11:30)  T(F): 99.1 (06 Jun 2024 11:30), Max: 99.1 (06 Jun 2024 11:30)  HR: 71 (06 Jun 2024 11:30) (71 - 138)  BP: 119/84 (06 Jun 2024 11:30) (111/84 - 132/75)  BP(mean): 94 (06 Jun 2024 11:30) (86 - 94)  RR: 14 (06 Jun 2024 11:30) (12 - 22)  SpO2: 100% (06 Jun 2024 11:30) (97% - 100%)    Parameters below as of 06 Jun 2024 11:30  Patient On (Oxygen Delivery Method): room air        CONSTITUTIONAL: Well-groomed, in no apparent distress  EYES: No conjunctival or scleral injection, non-icteric; PERRLA and symmetric  ENMT: No external nasal lesions; no pharyngeal injection or exudates, oral mucosa with moist membranes  NECK: Trachea midline without palpable neck mass; thyroid not enlarged and non-tender  RESPIRATORY: Breathing comfortably; lungs CTA without wheeze/rhonchi/rales  CARDIOVASCULAR: +S1S2, RRR, no M/G/R; pedal pulses full and symmetric; no lower extremity edema  GASTROINTESTINAL: No palpable masses or tenderness, +BS throughout, no rebound/guarding  MUSCULOSKELETAL: no digital clubbing or cyanosis; no paraspinal tenderness; normal strength and tone of extremities  SKIN: No rashes or ulcers noted; no subcutaneous nodules or induration palpable  NEUROLOGIC: CN II-XII intact; sensation intact in LEs b/l to light touch. +tongue fasciculations and hands tremors with outstretched hands  PSYCHIATRIC: A+O x 3; mood and affect appropriate; appropriate insight and judgment

## 2024-06-06 NOTE — ED ADULT NURSE NOTE - NSFALLRISKINTERV_ED_ALL_ED

## 2024-06-06 NOTE — ED ADULT NURSE NOTE - OBJECTIVE STATEMENT
34y F BIBEMS from home p/w shaking, chills and withdrawal symptoms. Pt is axo4, ambulates independently at baseline. Hx of ETOH abuse. Mentions that pt has been taking methadone and suddenly stopped taking meds. Also is a daily etoh drinker, states that consumes wine daily accompanied by liquor. Admits to being admitted in hospital for withdrawal. EMS placed pt on 2L NC due to desat. Pt states last drink was yesterday. Hx of SI in the past but currently denies SI or HI. Denies headache, dizziness, vision changes, shortness of breath, abdominal pain, diarrhea, fevers, dysuria, hematuria, recent illness travel or fall. Patient undressed and placed into gown, call bell in hand and side rails up with bed in lowest position for safety. blanket provided. Comfort and safety provided. Placed on cardiac monitor, sinus tachy.

## 2024-06-06 NOTE — H&P ADULT - PROBLEM SELECTOR PLAN 6
- bladder scan was >300 this morning, but then urinated  - repeat bladder scan  - encourage ambulation (if not too drowsy)
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
## 2024-06-06 NOTE — ED PROVIDER NOTE - ATTENDING CONTRIBUTION TO CARE
pt w tremors / w/d  states she drinks 2 bottles wine daily / 21->16 methadone monday  4mm pupils. tremlous . dry skin. anxious  ciwa protocol / cbc/cmp/lipase/ekg/ivf
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
## 2024-06-06 NOTE — PATIENT PROFILE ADULT - FALL HARM RISK - PATIENT NEEDS ASSISTANCE
LOS: 4 days   Patient Care Team:  Calvin Dhillon Jr., DO as PCP - General (Internal Medicine)    Chief Complaint: same    Subjective     History of Present Illness    Subjective Pt is awake and alert. No new issues. Pt requests accu checks changed to BID    History taken from: patient    Objective     Vital Signs  Temp:  [97.5 °F (36.4 °C)-97.8 °F (36.6 °C)] 97.8 °F (36.6 °C)  Heart Rate:  [70-76] 71  Resp:  [18] 18  BP: (132-145)/(61-66) 145/66    Objective exam unchanged    Results Review:     I reviewed the patient's new clinical results.    Medication Review:     Assessment/Plan     Active Problems:    Stroke      Assessment & Plan Continue to prepare for dc.    Kwabena Becerra MD  11/04/17  1:29 PM    Time:        No assistance needed

## 2024-06-06 NOTE — ED PROVIDER NOTE - PROGRESS NOTE DETAILS
Austyn Wagner MD: CIWA improved after Valium, will be admitted for medical management. I confirmed with a nurse at the [facility] that she last received a dose of 16mg of methadone on 6/3.

## 2024-06-06 NOTE — ED ADULT NURSE NOTE - NSSUHOSCREENINGYN_ED_ALL_ED
Diagnosed w/ pneumonia 2 days ago, started z-joshua and continues to have fever and body aches, no other medications were taken. Yes - the patient is able to be screened

## 2024-06-06 NOTE — ED PROVIDER NOTE - CLINICAL SUMMARY MEDICAL DECISION MAKING FREE TEXT BOX
34F here with concern for alcohol withdrawal given anxiety, tremulousness, poss visual hallucinations. Will give benzodiazepine, continue to monitor for effect. Will give fluids for tachycardia.

## 2024-06-06 NOTE — H&P ADULT - PROBLEM SELECTOR PLAN 1
- s/p 5mg valium IV x2, with little effect  - give 2mg IV ativan x1 and start ativan PO taper + 2mg PO PRN  - monitor CIWA

## 2024-06-06 NOTE — H&P ADULT - PROBLEM SELECTOR PLAN 2
- says she takes methadone 16mg QD, was prescribed more by [facility] in [place] but has been trying to taper down slowly  - cont methadone 16mg qd  - called multiple numbers for [facility], left messages for call back to verify dose

## 2024-06-06 NOTE — ED PROVIDER NOTE - PHYSICAL EXAMINATION
General: alert, oriented to person, time, place  Psych: anxious  Head: normocephalic; atraumatic  Eyes: PERRLA, EOMI, conjunctivae clear bilaterally, sclerae anicteric  ENT: no nasal flaring, patent nares  Cardio: RRR, no m/r/g, pulses 2+ b/l  Resp: CATB, no w/r/r  GI: soft/nondistended/nontender  : no CVA tenderness  Neuro: tremulous  Skin: No evidence of rash or bruising  MSK: normal movement of all extremities  Lymph/Vasc: no LE edema

## 2024-06-06 NOTE — CHART NOTE - NSCHARTNOTEFT_GEN_A_CORE
Called [facility] and confirmed patient's methadone dose is 16mg daily. She picked up 27 daily bottles on 6/3/2024.

## 2024-06-06 NOTE — H&P ADULT - ASSESSMENT
34F w/pmh alcohol use disorder, opioid use disorder, insomnia, s/p bariatric surgery, trigeminal neuralgia, presents to [facility] for cc alcohol withdrawal.

## 2024-06-06 NOTE — H&P ADULT - NSHPREVIEWOFSYSTEMS_GEN_ALL_CORE
REVIEW OF SYSTEMS:    CONSTITUTIONAL: No weakness, weight loss, fevers +chills  EYES/ENT: No visual changes;  No vertigo or throat pain   NECK: No pain or stiffness  RESPIRATORY: No cough, wheezing, hemoptysis; No shortness of breath  CARDIOVASCULAR: No chest pain or palpitations, MORROW  GASTROINTESTINAL: No abdominal or epigastric pain. No nausea, vomiting, or hematemesis; No constipation. +diarrhea No melena or hematochezia.  GENITOURINARY: No dysuria, frequency or hematuria  NEUROLOGICAL: No numbness or weakness, AAOX3  SKIN: No itching, rashes  MUSCULOSKELETAL: no joint erythema, no joint swelling  PSYCHIATRIC: no depression, +anxiety

## 2024-06-07 DIAGNOSIS — F11.20 OPIOID DEPENDENCE, UNCOMPLICATED: ICD-10-CM

## 2024-06-07 DIAGNOSIS — F31.9 BIPOLAR DISORDER, UNSPECIFIED: ICD-10-CM

## 2024-06-07 LAB
ALBUMIN SERPL ELPH-MCNC: 3.9 G/DL — SIGNIFICANT CHANGE UP (ref 3.3–5)
ALP SERPL-CCNC: 56 U/L — SIGNIFICANT CHANGE UP (ref 40–120)
ALT FLD-CCNC: 13 U/L — SIGNIFICANT CHANGE UP (ref 10–45)
ANION GAP SERPL CALC-SCNC: 10 MMOL/L — SIGNIFICANT CHANGE UP (ref 5–17)
ANION GAP SERPL CALC-SCNC: 11 MMOL/L — SIGNIFICANT CHANGE UP (ref 5–17)
AST SERPL-CCNC: 20 U/L — SIGNIFICANT CHANGE UP (ref 10–40)
BILIRUB SERPL-MCNC: 0.2 MG/DL — SIGNIFICANT CHANGE UP (ref 0.2–1.2)
BUN SERPL-MCNC: 12 MG/DL — SIGNIFICANT CHANGE UP (ref 7–23)
BUN SERPL-MCNC: 13 MG/DL — SIGNIFICANT CHANGE UP (ref 7–23)
CALCIUM SERPL-MCNC: 8.6 MG/DL — SIGNIFICANT CHANGE UP (ref 8.4–10.5)
CALCIUM SERPL-MCNC: 8.6 MG/DL — SIGNIFICANT CHANGE UP (ref 8.4–10.5)
CHLORIDE SERPL-SCNC: 107 MMOL/L — SIGNIFICANT CHANGE UP (ref 96–108)
CHLORIDE SERPL-SCNC: 107 MMOL/L — SIGNIFICANT CHANGE UP (ref 96–108)
CO2 SERPL-SCNC: 21 MMOL/L — LOW (ref 22–31)
CO2 SERPL-SCNC: 22 MMOL/L — SIGNIFICANT CHANGE UP (ref 22–31)
CREAT SERPL-MCNC: 0.76 MG/DL — SIGNIFICANT CHANGE UP (ref 0.5–1.3)
CREAT SERPL-MCNC: 0.79 MG/DL — SIGNIFICANT CHANGE UP (ref 0.5–1.3)
CULTURE RESULTS: SIGNIFICANT CHANGE UP
EGFR: 104 ML/MIN/1.73M2 — SIGNIFICANT CHANGE UP
EGFR: 99 ML/MIN/1.73M2 — SIGNIFICANT CHANGE UP
GLUCOSE SERPL-MCNC: 93 MG/DL — SIGNIFICANT CHANGE UP (ref 70–99)
GLUCOSE SERPL-MCNC: 94 MG/DL — SIGNIFICANT CHANGE UP (ref 70–99)
HCT VFR BLD CALC: 30.6 % — LOW (ref 34.5–45)
HGB BLD-MCNC: 9.6 G/DL — LOW (ref 11.5–15.5)
MAGNESIUM SERPL-MCNC: 2.1 MG/DL — SIGNIFICANT CHANGE UP (ref 1.6–2.6)
MAGNESIUM SERPL-MCNC: 2.1 MG/DL — SIGNIFICANT CHANGE UP (ref 1.6–2.6)
MCHC RBC-ENTMCNC: 28.7 PG — SIGNIFICANT CHANGE UP (ref 27–34)
MCHC RBC-ENTMCNC: 31.4 GM/DL — LOW (ref 32–36)
MCV RBC AUTO: 91.3 FL — SIGNIFICANT CHANGE UP (ref 80–100)
NRBC # BLD: 0 /100 WBCS — SIGNIFICANT CHANGE UP (ref 0–0)
PHOSPHATE SERPL-MCNC: 2.6 MG/DL — SIGNIFICANT CHANGE UP (ref 2.5–4.5)
PHOSPHATE SERPL-MCNC: 2.6 MG/DL — SIGNIFICANT CHANGE UP (ref 2.5–4.5)
PLATELET # BLD AUTO: 383 K/UL — SIGNIFICANT CHANGE UP (ref 150–400)
POTASSIUM SERPL-MCNC: 4 MMOL/L — SIGNIFICANT CHANGE UP (ref 3.5–5.3)
POTASSIUM SERPL-MCNC: 4 MMOL/L — SIGNIFICANT CHANGE UP (ref 3.5–5.3)
POTASSIUM SERPL-SCNC: 4 MMOL/L — SIGNIFICANT CHANGE UP (ref 3.5–5.3)
POTASSIUM SERPL-SCNC: 4 MMOL/L — SIGNIFICANT CHANGE UP (ref 3.5–5.3)
PROT SERPL-MCNC: 7 G/DL — SIGNIFICANT CHANGE UP (ref 6–8.3)
RBC # BLD: 3.35 M/UL — LOW (ref 3.8–5.2)
RBC # FLD: 15.6 % — HIGH (ref 10.3–14.5)
SODIUM SERPL-SCNC: 139 MMOL/L — SIGNIFICANT CHANGE UP (ref 135–145)
SODIUM SERPL-SCNC: 139 MMOL/L — SIGNIFICANT CHANGE UP (ref 135–145)
SPECIMEN SOURCE: SIGNIFICANT CHANGE UP
WBC # BLD: 4.84 K/UL — SIGNIFICANT CHANGE UP (ref 3.8–10.5)
WBC # FLD AUTO: 4.84 K/UL — SIGNIFICANT CHANGE UP (ref 3.8–10.5)

## 2024-06-07 PROCEDURE — 99232 SBSQ HOSP IP/OBS MODERATE 35: CPT

## 2024-06-07 PROCEDURE — 99223 1ST HOSP IP/OBS HIGH 75: CPT

## 2024-06-07 RX ORDER — MIRTAZAPINE 45 MG/1
45 TABLET, ORALLY DISINTEGRATING ORAL AT BEDTIME
Refills: 0 | Status: DISCONTINUED | OUTPATIENT
Start: 2024-06-07 | End: 2024-06-10

## 2024-06-07 RX ADMIN — Medication 1 PATCH: at 12:52

## 2024-06-07 RX ADMIN — Medication 4 MILLIGRAM(S): at 05:38

## 2024-06-07 RX ADMIN — Medication 1 PATCH: at 08:51

## 2024-06-07 RX ADMIN — Medication 3 MILLIGRAM(S): at 21:12

## 2024-06-07 RX ADMIN — LAMOTRIGINE 25 MILLIGRAM(S): 25 TABLET, ORALLY DISINTEGRATING ORAL at 12:51

## 2024-06-07 RX ADMIN — MIRTAZAPINE 45 MILLIGRAM(S): 45 TABLET, ORALLY DISINTEGRATING ORAL at 21:13

## 2024-06-07 RX ADMIN — Medication 1 MILLIGRAM(S): at 12:51

## 2024-06-07 RX ADMIN — METHADONE HYDROCHLORIDE 16 MILLIGRAM(S): 40 TABLET ORAL at 13:22

## 2024-06-07 RX ADMIN — GABAPENTIN 300 MILLIGRAM(S): 400 CAPSULE ORAL at 05:38

## 2024-06-07 RX ADMIN — Medication 1 PATCH: at 20:58

## 2024-06-07 RX ADMIN — Medication 3 MILLIGRAM(S): at 09:12

## 2024-06-07 RX ADMIN — GABAPENTIN 300 MILLIGRAM(S): 400 CAPSULE ORAL at 14:58

## 2024-06-07 RX ADMIN — Medication 3 MILLIGRAM(S): at 14:58

## 2024-06-07 RX ADMIN — Medication 1 TABLET(S): at 12:51

## 2024-06-07 RX ADMIN — Medication 0.3 MILLIGRAM(S): at 21:13

## 2024-06-07 RX ADMIN — Medication 4 MILLIGRAM(S): at 01:34

## 2024-06-07 RX ADMIN — Medication 3 MILLIGRAM(S): at 18:16

## 2024-06-07 RX ADMIN — GABAPENTIN 300 MILLIGRAM(S): 400 CAPSULE ORAL at 21:13

## 2024-06-07 RX ADMIN — Medication 1 PATCH: at 12:10

## 2024-06-07 NOTE — BH CONSULTATION LIAISON ASSESSMENT NOTE - NSBHCHARTREVIEWVS_PSY_A_CORE FT
Vital Signs Last 24 Hrs  T(C): 36.7 (07 Jun 2024 11:08), Max: 37 (06 Jun 2024 19:49)  T(F): 98.1 (07 Jun 2024 11:08), Max: 98.6 (06 Jun 2024 19:49)  HR: 82 (07 Jun 2024 11:08) (57 - 110)  BP: 109/71 (07 Jun 2024 11:08) (103/69 - 124/84)  BP(mean): 90 (06 Jun 2024 14:23) (90 - 90)  RR: 18 (07 Jun 2024 11:08) (18 - 18)  SpO2: 98% (07 Jun 2024 11:08) (98% - 100%)    Parameters below as of 07 Jun 2024 11:08  Patient On (Oxygen Delivery Method): room air

## 2024-06-07 NOTE — BH CONSULTATION LIAISON ASSESSMENT NOTE - CURRENT MEDICATION
MEDICATIONS  (STANDING):  cloNIDine 0.3 milliGRAM(s) Oral at bedtime  folic acid 1 milliGRAM(s) Oral daily  gabapentin 300 milliGRAM(s) Oral three times a day  lamoTRIgine 25 milliGRAM(s) Oral daily  LORazepam     Tablet 3 milliGRAM(s) Oral every 4 hours  LORazepam     Tablet   Oral   methadone   Solution 16 milliGRAM(s) Oral daily  mirtazapine 30 milliGRAM(s) Oral at bedtime  multivitamin 1 Tablet(s) Oral daily  nicotine -  14 mG/24Hr(s) Patch 1 Patch Transdermal daily    MEDICATIONS  (PRN):  acetaminophen     Tablet .. 650 milliGRAM(s) Oral every 6 hours PRN Temp greater or equal to 38C (100.4F), Mild Pain (1 - 3)  aluminum hydroxide/magnesium hydroxide/simethicone Suspension 30 milliLiter(s) Oral every 4 hours PRN Dyspepsia  LORazepam     Tablet 2 milliGRAM(s) Oral every 1 hour PRN CIWA-Ar score 8 or greater  LORazepam     Tablet 2 milliGRAM(s) Oral every 2 hours PRN Symptom-triggered 2 point increase in CIWA-Ar  melatonin 3 milliGRAM(s) Oral at bedtime PRN Insomnia  ondansetron Injectable 4 milliGRAM(s) IV Push every 8 hours PRN Nausea and/or Vomiting

## 2024-06-07 NOTE — BH CONSULTATION LIAISON ASSESSMENT NOTE - NSBHCHARTREVIEWINVESTIGATE_PSY_A_CORE FT
< from: 12 Lead ECG (06.06.24 @ 06:54) >      Ventricular Rate 119 BPM    Atrial Rate 119 BPM    P-R Interval 136 ms    QRS Duration 76 ms    Q-T Interval 302 ms    QTC Calculation(Bazett) 424 ms    P Axis 67 degrees    R Axis 70 degrees    T Axis 224 degrees    Diagnosis Line SINUS TACHYCARDIA  ST & T WAVE ABNORMALITY, CONSIDER INFERIOR ISCHEMIA  ABNORMAL ECG  WHEN COMPARED WITH ECG OF 26-JUN-2022 18:13,  SIGNIFICANT CHANGES HAVE OCCURRED  Confirmed by MADAN HWANG, AFRICA SUTHERLAND ([id number]) on 6/6/2024 4:24:14 PM    < end of copied text >

## 2024-06-07 NOTE — BH CONSULTATION LIAISON ASSESSMENT NOTE - PAST PSYCHOTROPIC MEDICATION
Recent Visits  Date Type Provider Dept   01/26/24 Office Visit Kortney Sanabria APRN - [facility]   11/06/23 Office Visit Kortney Sanabria APRN - [facility]   08/02/23 Office Visit Kortney Sanabria APRN - [facility]   05/15/23 Office Visit Kortney Sanabria APRN - [facility]   Showing recent visits within past 540 days with a meds authorizing provider and meeting all other requirements  Future Appointments  Date Type Provider Dept   05/06/24 Appointment Kortney Sanabria APRN - [facility]   Showing future appointments within next 150 days with a meds authorizing provider and meeting all other requirements       
seroquel, Zyprexa- too much weight gain, sedation  Trazodone- paradoxical insomnia
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
## 2024-06-07 NOTE — BH CONSULTATION LIAISON ASSESSMENT NOTE - NSSUICPROTFACT_PSY_ALL_CORE
Identifies reasons for living Responsibility to children, family, or others Responsibility to children, family, or others/Identifies reasons for living/Supportive social network of family or friends/Engaged in work or school

## 2024-06-07 NOTE — BH CONSULTATION LIAISON ASSESSMENT NOTE - NSBHSAALC_PSY_A_CORE FT
Started drinking at 12, has been drinking since and gone to rehab for detoxification. Started drinking at 12, has been drinking since and gone to rehab and detoxification.

## 2024-06-07 NOTE — BH CONSULTATION LIAISON ASSESSMENT NOTE - NSBHCHARTREVIEWLAB_PSY_A_CORE FT
9.6    4.84  )-----------( 383      ( 07 Jun 2024 11:09 )             30.6     06-07    139  |  107  |  13  ----------------------------<  93  4.0   |  22  |  0.79    Ca    8.6      07 Jun 2024 11:09  Phos  2.6     06-07  Mg     2.1     06-07    TPro  7.0  /  Alb  3.9  /  TBili  0.2  /  DBili  x   /  AST  20  /  ALT  13  /  AlkPhos  56  06-07    Urinalysis Basic - ( 07 Jun 2024 11:09 )    Color: x / Appearance: x / SG: x / pH: x  Gluc: 93 mg/dL / Ketone: x  / Bili: x / Urobili: x   Blood: x / Protein: x / Nitrite: x   Leuk Esterase: x / RBC: x / WBC x   Sq Epi: x / Non Sq Epi: x / Bacteria: x

## 2024-06-07 NOTE — BH CONSULTATION LIAISON ASSESSMENT NOTE - NSBHCONSULTRECOMMENDOTHER_PSY_A_CORE FT
1. c/w home psych meds- Neurontin, Clonidine, Methadone.  Increase Remeron to 45mg PO qHS.    2. C/w Ativan 4mg taper CIWA, thiamine/MVI/folic acid    3.  for substance abuse referral resources.  OP psych f/u with own psychiatrist after discharge

## 2024-06-07 NOTE — BH CONSULTATION LIAISON ASSESSMENT NOTE - RISK ASSESSMENT
Patient does have factors that put patient at risk such as their chronic alchocol abuse and trigeminal neuralgia. Risk factors: chronic alchocol abuse and trigeminal neuralgia. Risk factors: h/o mood disorder, h/o psych admissions, active substance abuse, chronic pain    Protective factors: no current SIIP/HIIP, no h/o SA/SIB, no access to weapons, engaged in work, dependent children, domiciled, social supports, positive therapeutic relationship, engaged in treatment, compliant with treatment, help-seeking behaviors, future-orientation    Overall, pt is at chronically elevated risk of harm mitigated by multiple protective factors and does not meet criteria for psychiatric admission at this time.
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
## 2024-06-07 NOTE — SBIRT NOTE ADULT - NSSBIRTDRGBRIEFINTDET_GEN_A_CORE
Patient endorsed history of heroin and cocaine use in the past but reports that she stopped using. Patient accepting of referral for rehab.
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
## 2024-06-07 NOTE — BH CONSULTATION LIAISON ASSESSMENT NOTE - NSSUICRSKFACTOR_PSY_ALL_CORE
Current and Past Psychiatric Diagnoses/Presenting Symptoms Current and Past Psychiatric Diagnoses/Activating Events/Stressors

## 2024-06-07 NOTE — BH CONSULTATION LIAISON ASSESSMENT NOTE - OTHER PAST PSYCHIATRIC HISTORY (INCLUDE DETAILS REGARDING ONSET, COURSE OF ILLNESS, INPATIENT/OUTPATIENT TREATMENT)
Bipolar disorder Bipolar disorder, mult past admissions, last a "couple years ago" for paranoia in s/o substance abuse on voluntary basis    denies prior SA    follows with OP psychiatrist Dr. Anderson at [facility]

## 2024-06-08 LAB
ANION GAP SERPL CALC-SCNC: 13 MMOL/L — SIGNIFICANT CHANGE UP (ref 5–17)
BUN SERPL-MCNC: 13 MG/DL — SIGNIFICANT CHANGE UP (ref 7–23)
CALCIUM SERPL-MCNC: 9.2 MG/DL — SIGNIFICANT CHANGE UP (ref 8.4–10.5)
CHLORIDE SERPL-SCNC: 104 MMOL/L — SIGNIFICANT CHANGE UP (ref 96–108)
CO2 SERPL-SCNC: 20 MMOL/L — LOW (ref 22–31)
CREAT SERPL-MCNC: 0.75 MG/DL — SIGNIFICANT CHANGE UP (ref 0.5–1.3)
EGFR: 106 ML/MIN/1.73M2 — SIGNIFICANT CHANGE UP
GLUCOSE SERPL-MCNC: 87 MG/DL — SIGNIFICANT CHANGE UP (ref 70–99)
HCT VFR BLD CALC: 32.4 % — LOW (ref 34.5–45)
HGB BLD-MCNC: 10.2 G/DL — LOW (ref 11.5–15.5)
MCHC RBC-ENTMCNC: 28.9 PG — SIGNIFICANT CHANGE UP (ref 27–34)
MCHC RBC-ENTMCNC: 31.5 GM/DL — LOW (ref 32–36)
MCV RBC AUTO: 91.8 FL — SIGNIFICANT CHANGE UP (ref 80–100)
NRBC # BLD: 0 /100 WBCS — SIGNIFICANT CHANGE UP (ref 0–0)
PLATELET # BLD AUTO: 382 K/UL — SIGNIFICANT CHANGE UP (ref 150–400)
POTASSIUM SERPL-MCNC: 3.9 MMOL/L — SIGNIFICANT CHANGE UP (ref 3.5–5.3)
POTASSIUM SERPL-SCNC: 3.9 MMOL/L — SIGNIFICANT CHANGE UP (ref 3.5–5.3)
RBC # BLD: 3.53 M/UL — LOW (ref 3.8–5.2)
RBC # FLD: 15.4 % — HIGH (ref 10.3–14.5)
SODIUM SERPL-SCNC: 137 MMOL/L — SIGNIFICANT CHANGE UP (ref 135–145)
WBC # BLD: 5.07 K/UL — SIGNIFICANT CHANGE UP (ref 3.8–10.5)
WBC # FLD AUTO: 5.07 K/UL — SIGNIFICANT CHANGE UP (ref 3.8–10.5)

## 2024-06-08 PROCEDURE — 99232 SBSQ HOSP IP/OBS MODERATE 35: CPT

## 2024-06-08 RX ADMIN — GABAPENTIN 300 MILLIGRAM(S): 400 CAPSULE ORAL at 21:01

## 2024-06-08 RX ADMIN — Medication 3 MILLIGRAM(S): at 01:01

## 2024-06-08 RX ADMIN — Medication 650 MILLIGRAM(S): at 21:31

## 2024-06-08 RX ADMIN — MIRTAZAPINE 45 MILLIGRAM(S): 45 TABLET, ORALLY DISINTEGRATING ORAL at 21:01

## 2024-06-08 RX ADMIN — Medication 2 MILLIGRAM(S): at 10:18

## 2024-06-08 RX ADMIN — Medication 2 MILLIGRAM(S): at 18:11

## 2024-06-08 RX ADMIN — Medication 650 MILLIGRAM(S): at 02:01

## 2024-06-08 RX ADMIN — Medication 650 MILLIGRAM(S): at 01:31

## 2024-06-08 RX ADMIN — Medication 1 PATCH: at 12:05

## 2024-06-08 RX ADMIN — Medication 3 MILLIGRAM(S): at 21:01

## 2024-06-08 RX ADMIN — Medication 1 PATCH: at 06:31

## 2024-06-08 RX ADMIN — Medication 3 MILLIGRAM(S): at 05:14

## 2024-06-08 RX ADMIN — Medication 1 MILLIGRAM(S): at 12:07

## 2024-06-08 RX ADMIN — Medication 1 PATCH: at 19:29

## 2024-06-08 RX ADMIN — Medication 0.3 MILLIGRAM(S): at 21:01

## 2024-06-08 RX ADMIN — Medication 2 MILLIGRAM(S): at 21:01

## 2024-06-08 RX ADMIN — Medication 2 MILLIGRAM(S): at 14:39

## 2024-06-08 RX ADMIN — METHADONE HYDROCHLORIDE 16 MILLIGRAM(S): 40 TABLET ORAL at 12:06

## 2024-06-08 RX ADMIN — Medication 1 PATCH: at 12:00

## 2024-06-08 RX ADMIN — GABAPENTIN 300 MILLIGRAM(S): 400 CAPSULE ORAL at 14:39

## 2024-06-08 RX ADMIN — Medication 1 TABLET(S): at 12:07

## 2024-06-08 RX ADMIN — LAMOTRIGINE 25 MILLIGRAM(S): 25 TABLET, ORALLY DISINTEGRATING ORAL at 12:07

## 2024-06-08 RX ADMIN — Medication 650 MILLIGRAM(S): at 21:01

## 2024-06-08 RX ADMIN — GABAPENTIN 300 MILLIGRAM(S): 400 CAPSULE ORAL at 05:13

## 2024-06-09 PROCEDURE — 99232 SBSQ HOSP IP/OBS MODERATE 35: CPT

## 2024-06-09 RX ADMIN — Medication 1 TABLET(S): at 11:51

## 2024-06-09 RX ADMIN — Medication 1 PATCH: at 06:03

## 2024-06-09 RX ADMIN — Medication 1 PATCH: at 11:51

## 2024-06-09 RX ADMIN — Medication 1 MILLIGRAM(S): at 10:25

## 2024-06-09 RX ADMIN — GABAPENTIN 300 MILLIGRAM(S): 400 CAPSULE ORAL at 14:05

## 2024-06-09 RX ADMIN — Medication 2 MILLIGRAM(S): at 05:58

## 2024-06-09 RX ADMIN — Medication 1 MILLIGRAM(S): at 18:19

## 2024-06-09 RX ADMIN — METHADONE HYDROCHLORIDE 16 MILLIGRAM(S): 40 TABLET ORAL at 11:51

## 2024-06-09 RX ADMIN — MIRTAZAPINE 45 MILLIGRAM(S): 45 TABLET, ORALLY DISINTEGRATING ORAL at 21:04

## 2024-06-09 RX ADMIN — Medication 1 MILLIGRAM(S): at 11:51

## 2024-06-09 RX ADMIN — Medication 1 PATCH: at 11:50

## 2024-06-09 RX ADMIN — GABAPENTIN 300 MILLIGRAM(S): 400 CAPSULE ORAL at 05:58

## 2024-06-09 RX ADMIN — Medication 2 MILLIGRAM(S): at 01:08

## 2024-06-09 RX ADMIN — LAMOTRIGINE 25 MILLIGRAM(S): 25 TABLET, ORALLY DISINTEGRATING ORAL at 11:51

## 2024-06-09 RX ADMIN — Medication 1 MILLIGRAM(S): at 21:04

## 2024-06-09 RX ADMIN — Medication 1 MILLIGRAM(S): at 14:05

## 2024-06-09 RX ADMIN — Medication 1 PATCH: at 19:00

## 2024-06-09 RX ADMIN — Medication 0.3 MILLIGRAM(S): at 21:04

## 2024-06-09 RX ADMIN — Medication 650 MILLIGRAM(S): at 22:28

## 2024-06-09 RX ADMIN — GABAPENTIN 300 MILLIGRAM(S): 400 CAPSULE ORAL at 21:04

## 2024-06-09 RX ADMIN — Medication 650 MILLIGRAM(S): at 20:03

## 2024-06-09 NOTE — PROGRESS NOTE ADULT - NSPROGADDITIONALINFOA_GEN_ALL_CORE
time spent reviewing prior charts, meds, discussing plan with patient=  45 minutes    d/w ACP Dominique ; likely discharge 6/9
time spent reviewing prior charts, meds, discussing plan with patient=  45 minutes    d/w ACP Spogmay ; likely discharge 6/10
time spent reviewing prior charts, meds, discussing plan with patient=  45 minutes    d/w ACP Anitha

## 2024-06-10 ENCOUNTER — TRANSCRIPTION ENCOUNTER (OUTPATIENT)
Age: 34
End: 2024-06-10

## 2024-06-10 VITALS
SYSTOLIC BLOOD PRESSURE: 106 MMHG | RESPIRATION RATE: 18 BRPM | HEART RATE: 74 BPM | DIASTOLIC BLOOD PRESSURE: 65 MMHG | TEMPERATURE: 98 F | OXYGEN SATURATION: 98 %

## 2024-06-10 PROCEDURE — 80053 COMPREHEN METABOLIC PANEL: CPT

## 2024-06-10 PROCEDURE — 99239 HOSP IP/OBS DSCHRG MGMT >30: CPT

## 2024-06-10 PROCEDURE — 96375 TX/PRO/DX INJ NEW DRUG ADDON: CPT

## 2024-06-10 PROCEDURE — 83735 ASSAY OF MAGNESIUM: CPT

## 2024-06-10 PROCEDURE — 82435 ASSAY OF BLOOD CHLORIDE: CPT

## 2024-06-10 PROCEDURE — 82803 BLOOD GASES ANY COMBINATION: CPT

## 2024-06-10 PROCEDURE — 99285 EMERGENCY DEPT VISIT HI MDM: CPT

## 2024-06-10 PROCEDURE — 84484 ASSAY OF TROPONIN QUANT: CPT

## 2024-06-10 PROCEDURE — 85014 HEMATOCRIT: CPT

## 2024-06-10 PROCEDURE — 83690 ASSAY OF LIPASE: CPT

## 2024-06-10 PROCEDURE — 84132 ASSAY OF SERUM POTASSIUM: CPT

## 2024-06-10 PROCEDURE — 80076 HEPATIC FUNCTION PANEL: CPT

## 2024-06-10 PROCEDURE — 82330 ASSAY OF CALCIUM: CPT

## 2024-06-10 PROCEDURE — 81001 URINALYSIS AUTO W/SCOPE: CPT

## 2024-06-10 PROCEDURE — 85025 COMPLETE CBC W/AUTO DIFF WBC: CPT

## 2024-06-10 PROCEDURE — 82947 ASSAY GLUCOSE BLOOD QUANT: CPT

## 2024-06-10 PROCEDURE — 85018 HEMOGLOBIN: CPT

## 2024-06-10 PROCEDURE — 84100 ASSAY OF PHOSPHORUS: CPT

## 2024-06-10 PROCEDURE — 84295 ASSAY OF SERUM SODIUM: CPT

## 2024-06-10 PROCEDURE — 85027 COMPLETE CBC AUTOMATED: CPT

## 2024-06-10 PROCEDURE — 80048 BASIC METABOLIC PNL TOTAL CA: CPT

## 2024-06-10 PROCEDURE — 83605 ASSAY OF LACTIC ACID: CPT

## 2024-06-10 PROCEDURE — 96374 THER/PROPH/DIAG INJ IV PUSH: CPT

## 2024-06-10 PROCEDURE — 87086 URINE CULTURE/COLONY COUNT: CPT

## 2024-06-10 PROCEDURE — 80307 DRUG TEST PRSMV CHEM ANLYZR: CPT

## 2024-06-10 RX ORDER — MIRTAZAPINE 45 MG/1
1 TABLET, ORALLY DISINTEGRATING ORAL
Refills: 0 | DISCHARGE

## 2024-06-10 RX ORDER — NICOTINE POLACRILEX 2 MG
1 GUM BUCCAL
Qty: 0 | Refills: 0 | DISCHARGE
Start: 2024-06-10

## 2024-06-10 RX ORDER — MIRTAZAPINE 45 MG/1
1 TABLET, ORALLY DISINTEGRATING ORAL
Qty: 30 | Refills: 0
Start: 2024-06-10 | End: 2024-07-09

## 2024-06-10 RX ORDER — LAMOTRIGINE 25 MG/1
1 TABLET, ORALLY DISINTEGRATING ORAL
Refills: 0 | DISCHARGE

## 2024-06-10 RX ORDER — LAMOTRIGINE 25 MG/1
1 TABLET, ORALLY DISINTEGRATING ORAL
Qty: 30 | Refills: 0
Start: 2024-06-10 | End: 2024-07-09

## 2024-06-10 RX ORDER — FOLIC ACID 0.8 MG
1 TABLET ORAL
Qty: 0 | Refills: 0 | DISCHARGE
Start: 2024-06-10

## 2024-06-10 RX ADMIN — Medication 1 MILLIGRAM(S): at 11:07

## 2024-06-10 RX ADMIN — Medication 1 PATCH: at 07:00

## 2024-06-10 RX ADMIN — Medication 1 TABLET(S): at 11:07

## 2024-06-10 RX ADMIN — LAMOTRIGINE 25 MILLIGRAM(S): 25 TABLET, ORALLY DISINTEGRATING ORAL at 11:07

## 2024-06-10 RX ADMIN — GABAPENTIN 300 MILLIGRAM(S): 400 CAPSULE ORAL at 05:06

## 2024-06-10 RX ADMIN — Medication 1 PATCH: at 11:07

## 2024-06-10 RX ADMIN — METHADONE HYDROCHLORIDE 16 MILLIGRAM(S): 40 TABLET ORAL at 11:07

## 2024-06-10 RX ADMIN — Medication 1 PATCH: at 11:00

## 2024-06-10 RX ADMIN — GABAPENTIN 300 MILLIGRAM(S): 400 CAPSULE ORAL at 14:05

## 2024-06-10 RX ADMIN — Medication 1 MILLIGRAM(S): at 01:29

## 2024-06-10 RX ADMIN — Medication 650 MILLIGRAM(S): at 05:06

## 2024-06-10 RX ADMIN — Medication 1 MILLIGRAM(S): at 05:06

## 2024-06-10 RX ADMIN — Medication 650 MILLIGRAM(S): at 06:12

## 2024-06-10 NOTE — PROGRESS NOTE ADULT - PROBLEM SELECTOR PLAN 6
- resolved  - encourage ambulation

## 2024-06-10 NOTE — PROGRESS NOTE ADULT - PROBLEM SELECTOR PLAN 7
low improve score  regular diet

## 2024-06-10 NOTE — PROGRESS NOTE ADULT - PROBLEM SELECTOR PLAN 3
- with associated insomnia  - cont home mirtaz 30, clonidine 0.3mg, gabapentin 300mg TID
- with associated insomnia  - cont home clonidine 0.3mg, gabapentin 300mg TID  - c/w Mirtazapine 45mg po qhs per Psych reccs
- with associated insomnia  - cont home clonidine 0.3mg, gabapentin 300mg TID  - Mirtazapine increased to 45mg poq hs per Psych reccs
- with associated insomnia  - cont home clonidine 0.3mg, gabapentin 300mg TID  - c/w Mirtazapine 45mg po qhs per Psych reccs

## 2024-06-10 NOTE — DISCHARGE NOTE PROVIDER - CARE PROVIDER_API CALL
Melanie Perez  Family Medicine  [address], [address]  [place] [address]  Phone: [phone]  Fax: [phone]  Follow Up Time:

## 2024-06-10 NOTE — DISCHARGE NOTE PROVIDER - NSDCFUADDAPPT_GEN_ALL_CORE_FT
APPTS ARE READY TO BE MADE: [x ] YES    Best Family or Patient Contact (if needed):    Additional Information about above appointments (if needed):    1:   2:   3:     Other comments or requests:    APPTS ARE READY TO BE MADE: [x ] YES    Best Family or Patient Contact (if needed):    Additional Information about above appointments (if needed):    1:   2:   3:     Other comments or requests:   Prior to outreaching the patient, it was visible that the patient has secured a follow up appointment which was not scheduled by our team. patient has an appt with Melanie Dunn 6/18/24  11am [address] [place], [place] [id number]

## 2024-06-10 NOTE — PROGRESS NOTE ADULT - PROBLEM SELECTOR PLAN 2
- says she takes methadone 16mg QD, was prescribed more by [facility] in [place] but has been trying to taper down slowly  - c/w methadone 16mg qd  - dose verified with [facility]
- says she takes methadone 16mg QD, was prescribed more by [facility] in [place] but has been trying to taper down slowly  - c/w methadone 16mg qd  - dose verified with [facility]
- On methadone 16mg QD (prescribed  by [facility] in [place] )  - c/w methadone 16mg qd  - dose verified with [facility]
- says she takes methadone 16mg QD, was prescribed more by [facility] in [place] but has been trying to taper down slowly  - c/w methadone 16mg qd  - dose verified with [facility]

## 2024-06-10 NOTE — PROGRESS NOTE ADULT - PROBLEM SELECTOR PLAN 4
cont lamictal 25 mg po daily ( pt was prescribed 100mg but has been taking 25mg daily due to adverse effects)  - f/up with primary Neurologist ( has an appointment tomorrow 6/11)
cont lamictal 25 mg po daily ( pt was prescribed 100mg but has been taking 25mg daily)
cont lamictal
cont lamictal - prescribed 100mg but has been taking 1/4-1/2 tabs

## 2024-06-10 NOTE — DISCHARGE NOTE PROVIDER - NSDCCPCAREPLAN_GEN_ALL_CORE_FT
PRINCIPAL DISCHARGE DIAGNOSIS  Diagnosis: Alcohol dependence with withdrawal  Assessment and Plan of Treatment: S/P ativan taper. Now resolved.      SECONDARY DISCHARGE DIAGNOSES  Diagnosis: Anxiety  Assessment and Plan of Treatment: Continue current medications.    Diagnosis: Opioid use disorder  Assessment and Plan of Treatment: Continue with methadone.     PRINCIPAL DISCHARGE DIAGNOSIS  Diagnosis: Alcohol dependence with withdrawal  Assessment and Plan of Treatment: S/P ativan taper. Now resolved. Please make appointment   with  [facility] Follow the information given by       SECONDARY DISCHARGE DIAGNOSES  Diagnosis: Anxiety  Assessment and Plan of Treatment: Continue current medications.    Diagnosis: Opioid use disorder  Assessment and Plan of Treatment: Continue with methadone.     PRINCIPAL DISCHARGE DIAGNOSIS  Diagnosis: Alcohol dependence with withdrawal  Assessment and Plan of Treatment: You were started on an ativan taper. Now resolved. Please make appointment   with  outpatient substance misuse programs in [facility]. Follow the information given by       SECONDARY DISCHARGE DIAGNOSES  Diagnosis: Opioid use disorder  Assessment and Plan of Treatment: Continue with methadone.    Diagnosis: Anxiety  Assessment and Plan of Treatment: Continue current medications.

## 2024-06-10 NOTE — PROGRESS NOTE ADULT - PROBLEM SELECTOR PLAN 1
- c/w ativan PO taper + 2mg PO PRN  - monitor CIWA ; score is 4 this AM  - SW following  - Psychiatry reccs appreciated
- c/w ativan PO taper + 2mg PO PRN  - monitor CIWA  - ALYSA eval  - Psychiatry consult
- last drink was on 6/6  - c/o headache and anxiety  - monitor CIWA ; score is 2 this AM  - c/w ativan PO taper + 2mg PO PRN  -  Psychiatry reccs appreciated  - SW following; pt accepting of referral for rehab.  - discharge planning 6/10
- last drink was on 6/6  - CIWA 0  - d/c ativan PO taper   -  Psychiatry reccs appreciated  - SW following; pt accepting of referral for outpatient rehab.  - stable for discharge today

## 2024-06-10 NOTE — DISCHARGE NOTE PROVIDER - NSDCFUSCHEDAPPT_GEN_ALL_CORE_FT
[facility]  [facility] [address]  Scheduled Appointment: 06/18/2024

## 2024-06-10 NOTE — PROGRESS NOTE ADULT - PROBLEM SELECTOR PLAN 5
history of bariatric surgery  replete
resolved  Pt with history of bariatric surgery
history of bariatric surgery  monitor and replete electrolytes as appropriate
history of bariatric surgery  monitor and replete electrolytes as appropriate

## 2024-06-10 NOTE — PROGRESS NOTE ADULT - SUBJECTIVE AND OBJECTIVE BOX
Patient is a 34y old  Female who presents with a chief complaint of alcohol withdrawal (07 Jun 2024 11:51)      SUBJECTIVE / OVERNIGHT EVENTS:  Pt seen and examined. No acute events overnight. c/o tremors + anxiety this morning.    MEDICATIONS  (STANDING):  cloNIDine 0.3 milliGRAM(s) Oral at bedtime  folic acid 1 milliGRAM(s) Oral daily  gabapentin 300 milliGRAM(s) Oral three times a day  lamoTRIgine 25 milliGRAM(s) Oral daily  LORazepam     Tablet 2 milliGRAM(s) Oral every 4 hours  LORazepam     Tablet   Oral   methadone   Solution 16 milliGRAM(s) Oral daily  mirtazapine 45 milliGRAM(s) Oral at bedtime  multivitamin 1 Tablet(s) Oral daily  nicotine -  14 mG/24Hr(s) Patch 1 Patch Transdermal daily    MEDICATIONS  (PRN):  acetaminophen     Tablet .. 650 milliGRAM(s) Oral every 6 hours PRN Temp greater or equal to 38C (100.4F), Mild Pain (1 - 3)  aluminum hydroxide/magnesium hydroxide/simethicone Suspension 30 milliLiter(s) Oral every 4 hours PRN Dyspepsia  LORazepam     Tablet 2 milliGRAM(s) Oral every 1 hour PRN CIWA-Ar score 8 or greater  LORazepam     Tablet 2 milliGRAM(s) Oral every 2 hours PRN Symptom-triggered 2 point increase in CIWA-Ar  melatonin 3 milliGRAM(s) Oral at bedtime PRN Insomnia  ondansetron Injectable 4 milliGRAM(s) IV Push every 8 hours PRN Nausea and/or Vomiting      Vital Signs Last 24 Hrs  T(C): 37.4 (08 Jun 2024 11:39), Max: 37.4 (08 Jun 2024 11:39)  T(F): 99.4 (08 Jun 2024 11:39), Max: 99.4 (08 Jun 2024 11:39)  HR: 107 (08 Jun 2024 11:39) (70 - 107)  BP: 115/76 (08 Jun 2024 11:39) (100/64 - 115/76)  BP(mean): --  RR: 18 (08 Jun 2024 11:39) (18 - 18)  SpO2: 99% (08 Jun 2024 11:39) (97% - 100%)    Parameters below as of 08 Jun 2024 11:39  Patient On (Oxygen Delivery Method): room air      CAPILLARY BLOOD GLUCOSE        I&O's Summary    07 Jun 2024 07:01  -  08 Jun 2024 07:00  --------------------------------------------------------  IN: 480 mL / OUT: 2 mL / NET: 478 mL        PHYSICAL EXAM:  GENERAL: NAD, well-groomed  HEAD:  Atraumatic, Normocephalic  EYES: EOMI, PERRLA, conjunctiva and sclera clear  NECK: Supple, No JVD  CHEST/LUNG: Clear to auscultation bilaterally; No wheeze  HEART: Regular rate and rhythm; No murmurs, rubs, or gallops  ABDOMEN: Soft, Nontender, Nondistended; Bowel sounds present  EXTREMITIES:  2+ Peripheral Pulses, No clubbing, cyanosis, or edema  PSYCH: AAOx3  NEUROLOGY: non-focal, no tremors, no tongue fasciculations  SKIN: No rashes or lesions    LABS:                        10.2   5.07  )-----------( 382      ( 08 Jun 2024 07:20 )             32.4     06-08    137  |  104  |  13  ----------------------------<  87  3.9   |  20<L>  |  0.75    Ca    9.2      08 Jun 2024 07:26  Phos  2.6     06-07  Mg     2.1     06-07    TPro  7.0  /  Alb  3.9  /  TBili  0.2  /  DBili  x   /  AST  20  /  ALT  13  /  AlkPhos  56  06-07          Urinalysis Basic - ( 08 Jun 2024 07:26 )    Color: x / Appearance: x / SG: x / pH: x  Gluc: 87 mg/dL / Ketone: x  / Bili: x / Urobili: x   Blood: x / Protein: x / Nitrite: x   Leuk Esterase: x / RBC: x / WBC x   Sq Epi: x / Non Sq Epi: x / Bacteria: x        
Patient is a 34y old  Female who presents with a chief complaint of alcohol withdrawal (08 Jun 2024 14:08)      SUBJECTIVE / OVERNIGHT EVENTS:    MEDICATIONS  (STANDING):  cloNIDine 0.3 milliGRAM(s) Oral at bedtime  folic acid 1 milliGRAM(s) Oral daily  gabapentin 300 milliGRAM(s) Oral three times a day  lamoTRIgine 25 milliGRAM(s) Oral daily  LORazepam     Tablet 1 milliGRAM(s) Oral every 4 hours  LORazepam     Tablet   Oral   methadone   Solution 16 milliGRAM(s) Oral daily  mirtazapine 45 milliGRAM(s) Oral at bedtime  multivitamin 1 Tablet(s) Oral daily  nicotine -  14 mG/24Hr(s) Patch 1 Patch Transdermal daily    MEDICATIONS  (PRN):  acetaminophen     Tablet .. 650 milliGRAM(s) Oral every 6 hours PRN Temp greater or equal to 38C (100.4F), Mild Pain (1 - 3)  aluminum hydroxide/magnesium hydroxide/simethicone Suspension 30 milliLiter(s) Oral every 4 hours PRN Dyspepsia  LORazepam     Tablet 2 milliGRAM(s) Oral every 1 hour PRN CIWA-Ar score 8 or greater  LORazepam     Tablet 2 milliGRAM(s) Oral every 2 hours PRN Symptom-triggered 2 point increase in CIWA-Ar  melatonin 3 milliGRAM(s) Oral at bedtime PRN Insomnia  ondansetron Injectable 4 milliGRAM(s) IV Push every 8 hours PRN Nausea and/or Vomiting      Vital Signs Last 24 Hrs  T(C): 36.7 (09 Jun 2024 06:00), Max: 37.7 (08 Jun 2024 20:32)  T(F): 98.1 (09 Jun 2024 06:00), Max: 99.8 (08 Jun 2024 20:32)  HR: 83 (09 Jun 2024 06:00) (71 - 94)  BP: 98/67 (09 Jun 2024 06:00) (98/67 - 115/78)  BP(mean): --  RR: 18 (09 Jun 2024 06:00) (18 - 18)  SpO2: 99% (09 Jun 2024 06:00) (97% - 99%)    Parameters below as of 09 Jun 2024 06:00  Patient On (Oxygen Delivery Method): room air      CAPILLARY BLOOD GLUCOSE        I&O's Summary    08 Jun 2024 07:01  -  09 Jun 2024 07:00  --------------------------------------------------------  IN: 480 mL / OUT: 0 mL / NET: 480 mL        PHYSICAL EXAM:  GENERAL: NAD, well-developed  HEAD:  Atraumatic, Normocephalic  EYES: EOMI, PERRLA, conjunctiva and sclera clear  NECK: Supple, No JVD  CHEST/LUNG: Clear to auscultation bilaterally; No wheeze  HEART: Regular rate and rhythm; No murmurs, rubs, or gallops  ABDOMEN: Soft, Nontender, Nondistended; Bowel sounds present  EXTREMITIES:  2+ Peripheral Pulses, No clubbing, cyanosis, or edema  PSYCH: AAOx3  NEUROLOGY: non-focal  SKIN: No rashes or lesions    LABS:                        10.2   5.07  )-----------( 382      ( 08 Jun 2024 07:20 )             32.4     06-08    137  |  104  |  13  ----------------------------<  87  3.9   |  20<L>  |  0.75    Ca    9.2      08 Jun 2024 07:26            Urinalysis Basic - ( 08 Jun 2024 07:26 )    Color: x / Appearance: x / SG: x / pH: x  Gluc: 87 mg/dL / Ketone: x  / Bili: x / Urobili: x   Blood: x / Protein: x / Nitrite: x   Leuk Esterase: x / RBC: x / WBC x   Sq Epi: x / Non Sq Epi: x / Bacteria: x        RADIOLOGY & ADDITIONAL TESTS:    Imaging Personally Reviewed:    Consultant(s) Notes Reviewed:      Care Discussed with Consultants/Other Providers:  
Patient is a 34y old  Female who presents with a chief complaint of alcohol withdrawal (06 Jun 2024 11:34)      SUBJECTIVE / OVERNIGHT EVENTS: Pt seen and examined. No acute events overnight. She denies tremors, cp, sob, diaphoresis, palpitations. Reports mild headache.    MEDICATIONS  (STANDING):  cloNIDine 0.3 milliGRAM(s) Oral at bedtime  folic acid 1 milliGRAM(s) Oral daily  gabapentin 300 milliGRAM(s) Oral three times a day  lamoTRIgine 25 milliGRAM(s) Oral daily  LORazepam     Tablet   Oral   LORazepam     Tablet 3 milliGRAM(s) Oral every 4 hours  methadone   Solution 16 milliGRAM(s) Oral daily  mirtazapine 30 milliGRAM(s) Oral at bedtime  multivitamin 1 Tablet(s) Oral daily  nicotine -  14 mG/24Hr(s) Patch 1 Patch Transdermal daily    MEDICATIONS  (PRN):  acetaminophen     Tablet .. 650 milliGRAM(s) Oral every 6 hours PRN Temp greater or equal to 38C (100.4F), Mild Pain (1 - 3)  aluminum hydroxide/magnesium hydroxide/simethicone Suspension 30 milliLiter(s) Oral every 4 hours PRN Dyspepsia  LORazepam     Tablet 2 milliGRAM(s) Oral every 1 hour PRN CIWA-Ar score 8 or greater  LORazepam     Tablet 2 milliGRAM(s) Oral every 2 hours PRN Symptom-triggered 2 point increase in CIWA-Ar  melatonin 3 milliGRAM(s) Oral at bedtime PRN Insomnia  ondansetron Injectable 4 milliGRAM(s) IV Push every 8 hours PRN Nausea and/or Vomiting      Vital Signs Last 24 Hrs  T(C): 36.7 (07 Jun 2024 11:08), Max: 37.2 (06 Jun 2024 12:30)  T(F): 98.1 (07 Jun 2024 11:08), Max: 98.9 (06 Jun 2024 12:30)  HR: 82 (07 Jun 2024 11:08) (57 - 110)  BP: 109/71 (07 Jun 2024 11:08) (103/69 - 124/84)  BP(mean): 90 (06 Jun 2024 14:23) (90 - 94)  RR: 18 (07 Jun 2024 11:08) (12 - 18)  SpO2: 98% (07 Jun 2024 11:08) (98% - 100%)    Parameters below as of 07 Jun 2024 11:08  Patient On (Oxygen Delivery Method): room air      CAPILLARY BLOOD GLUCOSE        I&O's Summary    07 Jun 2024 07:01  -  07 Jun 2024 11:51  --------------------------------------------------------  IN: 240 mL / OUT: 0 mL / NET: 240 mL        PHYSICAL EXAM:  GENERAL: NAD, well-groomed  HEAD:  Atraumatic, Normocephalic  EYES: EOMI, PERRLA, conjunctiva and sclera clear  NECK: Supple, No JVD  CHEST/LUNG: Clear to auscultation bilaterally; No wheeze  HEART: Regular rate and rhythm; No murmurs, rubs, or gallops  ABDOMEN: Soft, Nontender, Nondistended; Bowel sounds present  EXTREMITIES:  2+ Peripheral Pulses, No clubbing, cyanosis, or edema  PSYCH: AAOx3  NEUROLOGY: non-focal, no tremors, no tongue fasciculations  SKIN: No rashes or lesions    LABS:                        9.6    4.84  )-----------( 383      ( 07 Jun 2024 11:09 )             30.6     06-07    139  |  107  |  13  ----------------------------<  93  4.0   |  22  |  0.79    Ca    8.6      07 Jun 2024 11:09  Phos  2.6     06-07  Mg     2.1     06-07    TPro  7.0  /  Alb  3.9  /  TBili  0.2  /  DBili  x   /  AST  20  /  ALT  13  /  AlkPhos  56  06-07          Urinalysis Basic - ( 07 Jun 2024 11:09 )    Color: x / Appearance: x / SG: x / pH: x  Gluc: 93 mg/dL / Ketone: x  / Bili: x / Urobili: x   Blood: x / Protein: x / Nitrite: x   Leuk Esterase: x / RBC: x / WBC x   Sq Epi: x / Non Sq Epi: x / Bacteria: x        
Patient is a 34y old  Female who presents with a chief complaint of alcohol withdrawal (10 Bhanu 2024 11:27)      SUBJECTIVE / OVERNIGHT EVENTS:  Pt seen and examined. No acute events overnight. Denies headache, tremors, palpitations, diaphoresis.    MEDICATIONS  (STANDING):  cloNIDine 0.3 milliGRAM(s) Oral at bedtime  folic acid 1 milliGRAM(s) Oral daily  gabapentin 300 milliGRAM(s) Oral three times a day  lamoTRIgine 25 milliGRAM(s) Oral daily  LORazepam     Tablet 1 milliGRAM(s) Oral every 12 hours  LORazepam     Tablet   Oral   methadone   Solution 16 milliGRAM(s) Oral daily  mirtazapine 45 milliGRAM(s) Oral at bedtime  multivitamin 1 Tablet(s) Oral daily  nicotine -  14 mG/24Hr(s) Patch 1 Patch Transdermal daily    MEDICATIONS  (PRN):  acetaminophen     Tablet .. 650 milliGRAM(s) Oral every 6 hours PRN Temp greater or equal to 38C (100.4F), Mild Pain (1 - 3)  aluminum hydroxide/magnesium hydroxide/simethicone Suspension 30 milliLiter(s) Oral every 4 hours PRN Dyspepsia  LORazepam     Tablet 2 milliGRAM(s) Oral every 2 hours PRN Symptom-triggered 2 point increase in CIWA-Ar  LORazepam     Tablet 2 milliGRAM(s) Oral every 1 hour PRN CIWA-Ar score 8 or greater  melatonin 3 milliGRAM(s) Oral at bedtime PRN Insomnia  ondansetron Injectable 4 milliGRAM(s) IV Push every 8 hours PRN Nausea and/or Vomiting      Vital Signs Last 24 Hrs  T(C): 36.6 (10 Bhanu 2024 08:10), Max: 37.1 (09 Jun 2024 13:44)  T(F): 97.9 (10 Bhanu 2024 08:10), Max: 98.7 (09 Jun 2024 13:44)  HR: 79 (10 Bhanu 2024 08:10) (72 - 95)  BP: 97/56 (10 Bhanu 2024 08:10) (97/56 - 133/79)  BP(mean): --  RR: 18 (10 Bhanu 2024 08:10) (18 - 18)  SpO2: 97% (10 Bhanu 2024 08:10) (97% - 99%)    Parameters below as of 10 Bhanu 2024 08:10  Patient On (Oxygen Delivery Method): room air      CAPILLARY BLOOD GLUCOSE        I&O's Summary    09 Jun 2024 07:01  -  10 Bhanu 2024 07:00  --------------------------------------------------------  IN: 480 mL / OUT: 0 mL / NET: 480 mL    10 Bhanu 2024 07:01  -  10 Bhanu 2024 12:38  --------------------------------------------------------  IN: 480 mL / OUT: 0 mL / NET: 480 mL        PHYSICAL EXAM:  GENERAL: NAD, well-groomed  HEAD:  Atraumatic, Normocephalic  EYES: EOMI, PERRLA, conjunctiva and sclera clear  NECK: Supple, No JVD  CHEST/LUNG: Clear to auscultation bilaterally; No wheeze  HEART: Regular rate and rhythm; No murmurs, rubs, or gallops  ABDOMEN: Soft, Nontender, Nondistended; Bowel sounds present  EXTREMITIES:  2+ Peripheral Pulses, No clubbing, cyanosis, or edema  PSYCH: AAOx3  NEUROLOGY: non-focal  SKIN: No rashes or lesions
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
## 2024-06-10 NOTE — PROGRESS NOTE ADULT - TIME BILLING
case complexity and discharge planning. Pt is stable for discharge home to f/up with PCP, Neurologist, outpt rehab in 1-2 weeks.    d/w ACP Rosy

## 2024-06-10 NOTE — PROGRESS NOTE ADULT - ASSESSMENT
34F w/pmh alcohol use disorder, opioid use disorder, insomnia, s/p bariatric surgery, trigeminal neuralgia, presents to [facility] for cc alcohol withdrawal. 
34F w/pmh alcohol use disorder, opioid use disorder, insomnia, s/p bariatric surgery, trigeminal neuralgia, presents to [facility] for cc alcohol withdrawal. 
34F w/pmh alcohol use disorder, opioid use disorder, insomnia, s/p bariatric surgery, trigeminal neuralgia, presents to [facility] for cc alcohol withdrawal. 
34F w/pmh alcohol use disorder, opioid use disorder, insomnia, s/p bariatric surgery, trigeminal neuralgia, presents to [facility] for cc alcohol withdrawal.

## 2024-06-10 NOTE — DISCHARGE NOTE NURSING/CASE MANAGEMENT/SOCIAL WORK - NSDCPEWEB_GEN_ALL_CORE
[facility] for Tobacco Control website --- [email or web address] website --- [email or web address]

## 2024-06-10 NOTE — DISCHARGE NOTE NURSING/CASE MANAGEMENT/SOCIAL WORK - NSDCPEEMAIL_GEN_ALL_CORE
[facility] for Tobacco Control email [email or web address]

## 2024-06-10 NOTE — DISCHARGE NOTE NURSING/CASE MANAGEMENT/SOCIAL WORK - PATIENT PORTAL LINK FT
You can access the FollowMyHealth Patient Portal offered by [facility] by registering at the following website: [email or web address]. By joining [employer]’s FollowMyHealth portal, you will also be able to view your health information using other applications (apps) compatible with our system.

## 2024-06-10 NOTE — DISCHARGE NOTE PROVIDER - NSDCMRMEDTOKEN_GEN_ALL_CORE_FT
cloNIDine 0.1 mg oral tablet: 3 tab(s) orally once a day (at bedtime)  gabapentin 300 mg oral capsule: 1 cap(s) orally 3 times a day  LaMICtal 100 mg oral tablet: 1 tab(s) orally once a day Says she&#x27;s been lowering the dose herself due to side effects, takes about 1/4 of a pill.  methadone 10 mg/5 mL oral solution: 16 milligram(s) orally once a day  mirtazapine 30 mg oral tablet: 1 tab(s) orally once a day (at bedtime)   cloNIDine 0.3 mg oral tablet: 1 tab(s) orally once a day (at bedtime)  folic acid 1 mg oral tablet: 1 tab(s) orally once a day  gabapentin 300 mg oral capsule: 1 cap(s) orally 3 times a day  LaMICtal 100 mg oral tablet: 1 tab(s) orally once a day Says she&#x27;s been lowering the dose herself due to side effects, takes about 1/4 of a pill.  methadone 10 mg/5 mL oral solution: 16 milligram(s) orally once a day  mirtazapine 30 mg oral tablet: 1 tab(s) orally once a day (at bedtime)  Multiple Vitamins oral tablet: 1 tab(s) orally once a day  nicotine 14 mg/24 hr transdermal film, extended release: 1 patch transdermal once a day   cloNIDine 0.3 mg oral tablet: 1 tab(s) orally once a day (at bedtime)  folic acid 1 mg oral tablet: 1 tab(s) orally once a day  gabapentin 300 mg oral capsule: 1 cap(s) orally 3 times a day  lamoTRIgine 25 mg oral tablet: 1 tab(s) orally once a day  methadone 10 mg/5 mL oral solution: 16 milligram(s) orally once a day  mirtazapine 45 mg oral tablet: 1 tab(s) orally once a day (at bedtime)  Multiple Vitamins oral tablet: 1 tab(s) orally once a day  nicotine 14 mg/24 hr transdermal film, extended release: 1 patch transdermal once a day

## 2024-06-21 ENCOUNTER — APPOINTMENT (OUTPATIENT)
Dept: FAMILY MEDICINE | Facility: CLINIC | Age: 34
End: 2024-06-21
Payer: MEDICAID

## 2024-06-21 VITALS
OXYGEN SATURATION: 96 % | TEMPERATURE: 95.2 F | SYSTOLIC BLOOD PRESSURE: 120 MMHG | DIASTOLIC BLOOD PRESSURE: 75 MMHG | HEART RATE: 110 BPM | RESPIRATION RATE: 16 BRPM | HEIGHT: 72 IN | BODY MASS INDEX: 28.99 KG/M2 | WEIGHT: 214 LBS

## 2024-06-21 DIAGNOSIS — Z87.2 PERSONAL HISTORY OF DISEASES OF THE SKIN AND SUBCUTANEOUS TISSUE: ICD-10-CM

## 2024-06-21 DIAGNOSIS — Z87.09 PERSONAL HISTORY OF OTHER DISEASES OF THE RESPIRATORY SYSTEM: ICD-10-CM

## 2024-06-21 DIAGNOSIS — F32.A ANXIETY DISORDER, UNSPECIFIED: ICD-10-CM

## 2024-06-21 DIAGNOSIS — Z98.84 BARIATRIC SURGERY STATUS: ICD-10-CM

## 2024-06-21 DIAGNOSIS — E66.3 OVERWEIGHT: ICD-10-CM

## 2024-06-21 DIAGNOSIS — Z87.19 PERSONAL HISTORY OF OTHER DISEASES OF THE DIGESTIVE SYSTEM: ICD-10-CM

## 2024-06-21 DIAGNOSIS — R68.84 JAW PAIN: ICD-10-CM

## 2024-06-21 DIAGNOSIS — M54.16 RADICULOPATHY, LUMBAR REGION: ICD-10-CM

## 2024-06-21 DIAGNOSIS — G50.0 TRIGEMINAL NEURALGIA: ICD-10-CM

## 2024-06-21 DIAGNOSIS — F41.9 ANXIETY DISORDER, UNSPECIFIED: ICD-10-CM

## 2024-06-21 DIAGNOSIS — G89.29 PAIN IN RIGHT KNEE: ICD-10-CM

## 2024-06-21 DIAGNOSIS — M25.561 PAIN IN RIGHT KNEE: ICD-10-CM

## 2024-06-21 DIAGNOSIS — Z86.69 PERSONAL HISTORY OF OTHER DISEASES OF THE NERVOUS SYSTEM AND SENSE ORGANS: ICD-10-CM

## 2024-06-21 DIAGNOSIS — B37.9 CANDIDIASIS, UNSPECIFIED: ICD-10-CM

## 2024-06-21 DIAGNOSIS — G89.29 PAIN IN RIGHT SHOULDER: ICD-10-CM

## 2024-06-21 DIAGNOSIS — Z87.898 PERSONAL HISTORY OF OTHER SPECIFIED CONDITIONS: ICD-10-CM

## 2024-06-21 DIAGNOSIS — M25.562 PAIN IN RIGHT KNEE: ICD-10-CM

## 2024-06-21 DIAGNOSIS — R10.9 UNSPECIFIED ABDOMINAL PAIN: ICD-10-CM

## 2024-06-21 DIAGNOSIS — M25.511 PAIN IN RIGHT SHOULDER: ICD-10-CM

## 2024-06-21 DIAGNOSIS — Z87.39 PERSONAL HISTORY OF OTHER DISEASES OF THE MUSCULOSKELETAL SYSTEM AND CONNECTIVE TISSUE: ICD-10-CM

## 2024-06-21 DIAGNOSIS — F11.90 OPIOID USE, UNSPECIFIED, UNCOMPLICATED: ICD-10-CM

## 2024-06-21 DIAGNOSIS — F10.10 ALCOHOL ABUSE, UNCOMPLICATED: ICD-10-CM

## 2024-06-21 DIAGNOSIS — Z00.00 ENCOUNTER FOR GENERAL ADULT MEDICAL EXAMINATION W/OUT ABNORMAL FINDINGS: ICD-10-CM

## 2024-06-21 DIAGNOSIS — R63.0 ANOREXIA: ICD-10-CM

## 2024-06-21 DIAGNOSIS — L98.7 EXCESSIVE AND REDUNDANT SKIN AND SUBCUTANEOUS TISSUE: ICD-10-CM

## 2024-06-21 DIAGNOSIS — M54.9 DORSALGIA, UNSPECIFIED: ICD-10-CM

## 2024-06-21 DIAGNOSIS — R79.89 OTHER SPECIFIED ABNORMAL FINDINGS OF BLOOD CHEMISTRY: ICD-10-CM

## 2024-06-21 DIAGNOSIS — M25.512 PAIN IN LEFT SHOULDER: ICD-10-CM

## 2024-06-21 DIAGNOSIS — M25.50 PAIN IN UNSPECIFIED JOINT: ICD-10-CM

## 2024-06-21 DIAGNOSIS — M25.512 PAIN IN RIGHT SHOULDER: ICD-10-CM

## 2024-06-21 DIAGNOSIS — G89.29 PAIN IN LEFT SHOULDER: ICD-10-CM

## 2024-06-21 PROBLEM — F10.20 ALCOHOL DEPENDENCE, UNCOMPLICATED: Chronic | Status: ACTIVE | Noted: 2024-06-06

## 2024-06-21 PROCEDURE — 99395 PREV VISIT EST AGE 18-39: CPT

## 2024-06-21 PROCEDURE — 96372 THER/PROPH/DIAG INJ SC/IM: CPT

## 2024-06-21 RX ORDER — CYANOCOBALAMIN 1000 UG/ML
1000 INJECTION INTRAMUSCULAR; SUBCUTANEOUS
Qty: 0 | Refills: 0 | Status: COMPLETED | OUTPATIENT
Start: 2024-06-21

## 2024-06-21 RX ORDER — LAMOTRIGINE 100 MG/1
100 TABLET ORAL
Qty: 60 | Refills: 0 | Status: COMPLETED | COMMUNITY
Start: 2024-05-03

## 2024-06-21 RX ADMIN — CYANOCOBALAMIN 0 MCG/ML: 1000 INJECTION INTRAMUSCULAR; SUBCUTANEOUS at 00:00

## 2024-06-25 DIAGNOSIS — R09.81 NASAL CONGESTION: ICD-10-CM

## 2024-06-25 PROBLEM — M25.512 CHRONIC LEFT SHOULDER PAIN: Status: RESOLVED | Noted: 2023-09-12 | Resolved: 2024-06-25

## 2024-06-25 PROBLEM — Z87.2 HISTORY OF DERMATITIS: Status: RESOLVED | Noted: 2023-12-04 | Resolved: 2024-06-25

## 2024-06-25 PROBLEM — M25.50 MULTIPLE JOINT PAIN: Status: ACTIVE | Noted: 2023-06-09

## 2024-06-25 PROBLEM — B37.9 YEAST INFECTION: Status: RESOLVED | Noted: 2023-12-04 | Resolved: 2024-06-25

## 2024-06-25 PROBLEM — E66.3 OVERWEIGHT: Status: ACTIVE | Noted: 2017-11-13

## 2024-06-25 PROBLEM — Z87.19 HISTORY OF RECTAL BLEEDING: Status: RESOLVED | Noted: 2023-06-09 | Resolved: 2024-06-25

## 2024-06-25 PROBLEM — M54.16 LUMBAR RADICULOPATHY: Status: ACTIVE | Noted: 2023-06-28

## 2024-06-25 PROBLEM — R68.84 JAW PAIN, NON-TMJ: Status: RESOLVED | Noted: 2023-12-04 | Resolved: 2024-06-25

## 2024-06-25 PROBLEM — M54.9 UPPER BACK PAIN: Status: ACTIVE | Noted: 2023-06-07

## 2024-06-25 PROBLEM — R63.0 APPETITE LOSS: Status: RESOLVED | Noted: 2023-09-12 | Resolved: 2024-06-25

## 2024-06-25 PROBLEM — L98.7 EXCESS SKIN: Status: RESOLVED | Noted: 2024-01-18 | Resolved: 2024-06-25

## 2024-06-25 PROBLEM — Z87.898 HISTORY OF WEIGHT LOSS: Status: RESOLVED | Noted: 2023-09-12 | Resolved: 2024-06-25

## 2024-06-25 PROBLEM — Z87.09 HISTORY OF SORE THROAT: Status: RESOLVED | Noted: 2023-12-04 | Resolved: 2024-06-25

## 2024-06-25 PROBLEM — M25.511 CHRONIC PAIN OF BOTH SHOULDERS: Status: ACTIVE | Noted: 2023-11-08

## 2024-06-25 PROBLEM — M25.561 ACUTE PAIN OF RIGHT KNEE: Status: RESOLVED | Noted: 2023-06-20 | Resolved: 2024-06-25

## 2024-06-25 PROBLEM — R68.84 JAW PAIN: Status: RESOLVED | Noted: 2023-08-10 | Resolved: 2024-06-25

## 2024-06-25 PROBLEM — Z87.898 HISTORY OF WEIGHT GAIN: Status: RESOLVED | Noted: 2022-03-10 | Resolved: 2024-06-25

## 2024-06-25 PROBLEM — R10.9 ABDOMINAL DISCOMFORT: Status: RESOLVED | Noted: 2023-12-04 | Resolved: 2024-06-25

## 2024-06-25 PROBLEM — Z87.39 HISTORY OF LOW BACK PAIN: Status: RESOLVED | Noted: 2023-06-07 | Resolved: 2024-06-25

## 2024-06-25 PROBLEM — M25.561 CHRONIC PAIN OF BOTH KNEES: Status: ACTIVE | Noted: 2024-06-25

## 2024-06-25 RX ORDER — KETOCONAZOLE 20 MG/G
2 CREAM TOPICAL TWICE DAILY
Qty: 1 | Refills: 0 | Status: COMPLETED | COMMUNITY
Start: 2023-12-04 | End: 2024-06-25

## 2024-06-25 RX ORDER — METHADONE HYDROCHLORIDE 10 MG/ML
CONCENTRATE ORAL DAILY
Refills: 0 | Status: ACTIVE | COMMUNITY

## 2024-06-25 NOTE — REVIEW OF SYSTEMS
[Recent Change In Weight] : ~T recent weight change [Joint Pain] : joint pain [Joint Stiffness] : joint stiffness [Back Pain] : back pain [Headache] : headache [Anxiety] : anxiety [Negative] : Heme/Lymph

## 2024-06-25 NOTE — PHYSICAL EXAM
[No Acute Distress] : no acute distress [Well Nourished] : well nourished [Well-Appearing] : well-appearing [Normal Sclera/Conjunctiva] : normal sclera/conjunctiva [PERRL] : pupils equal round and reactive to light [EOMI] : extraocular movements intact [Normal Outer Ear/Nose] : the outer ears and nose were normal in appearance [Normal Oropharynx] : the oropharynx was normal [No JVD] : no jugular venous distention [No Lymphadenopathy] : no lymphadenopathy [Supple] : supple [Thyroid Normal, No Nodules] : the thyroid was normal and there were no nodules present [No Respiratory Distress] : no respiratory distress  [No Accessory Muscle Use] : no accessory muscle use [Clear to Auscultation] : lungs were clear to auscultation bilaterally [Normal Rate] : normal rate  [Regular Rhythm] : with a regular rhythm [Normal S1, S2] : normal S1 and S2 [No Murmur] : no murmur heard [No Carotid Bruits] : no carotid bruits [No Abdominal Bruit] : a ~M bruit was not heard ~T in the abdomen [No Varicosities] : no varicosities [Pedal Pulses Present] : the pedal pulses are present [No Edema] : there was no peripheral edema [No Palpable Aorta] : no palpable aorta [No Extremity Clubbing/Cyanosis] : no extremity clubbing/cyanosis [Soft] : abdomen soft [Non Tender] : non-tender [Non-distended] : non-distended [No Masses] : no abdominal mass palpated [No HSM] : no HSM [Normal Bowel Sounds] : normal bowel sounds [Normal Posterior Cervical Nodes] : no posterior cervical lymphadenopathy [Normal Anterior Cervical Nodes] : no anterior cervical lymphadenopathy [No CVA Tenderness] : no CVA  tenderness [No Spinal Tenderness] : no spinal tenderness [Grossly Normal Strength/Tone] : grossly normal strength/tone [No Rash] : no rash [Coordination Grossly Intact] : coordination grossly intact [No Focal Deficits] : no focal deficits [Normal Affect] : the affect was normal [Normal Insight/Judgement] : insight and judgment were intact

## 2024-06-25 NOTE — ASSESSMENT
[FreeTextEntry1] : Routine medical examination VSS  reviewed hospital records  c/w current medications Advised healthy diet and exercise will follow up labs  referred as above letter written for patient for work and ESP[employer]  patient verbalizes understanding and patient is stable upon discharge

## 2024-06-25 NOTE — HEALTH RISK ASSESSMENT
[Very Good] : ~his/her~  mood as very good [Yes] : Yes [4 or more  times a week (4 pts)] : 4 or more  times a week (4 points) [5 or 6 (2 pts)] : 5 or 6 (2  points) [Daily or almost daily (4 pts)] : Daily or almost daily (4 points) [0] : 2) Feeling down, depressed, or hopeless: Not at all (0) [PHQ-2 Negative - No further assessment needed] : PHQ-2 Negative - No further assessment needed [Never] : Never [HIV test declined] : HIV test declined [Hepatitis C test declined] : Hepatitis C test declined [None] : None [With Family] : lives with family [# Of Children ___] : has [unfilled] children [Feels Safe at Home] : Feels safe at home [Fully functional (bathing, dressing, toileting, transferring, walking, feeding)] : Fully functional (bathing, dressing, toileting, transferring, walking, feeding) [Fully functional (using the telephone, shopping, preparing meals, housekeeping, doing laundry, using] : Fully functional and needs no help or supervision to perform IADLs (using the telephone, shopping, preparing meals, housekeeping, doing laundry, using transportation, managing medications and managing finances) [Good] : ~his/her~  mood as  good [FreeTextEntry1] : ^ see above  [Intercurrent hospitalizations] : was admitted to the hospital  [de-identified] : see above  [de-identified] : pain management, neuro, psych, methadone clinic, psychiatrist  [de-identified] : currently not drinking as of 6/6; 2 bottles of wine a day  [Audit-CScore] : 10 [[id number]] : 0 [Patient reported PAP Smear was normal] : Patient reported PAP Smear was normal [Change in mental status noted] : No change in mental status noted [Language] : denies difficulty with language [Employed] : employed [Sexually Active] : not sexually active [High Risk Behavior] : no high risk behavior [Reports changes in hearing] : Reports no changes in hearing [Reports changes in vision] : Reports no changes in vision [Reports changes in dental health] : Reports no changes in dental health [PapSmearDate] : 03/2024 [de-identified] : mother and son [FreeTextEntry2] :

## 2024-06-25 NOTE — HISTORY OF PRESENT ILLNESS
[FreeTextEntry1] : CPE [de-identified] : 33 yo f, pmhx of GERD, anxiety, depression, history of substance abuse (alcohol and on methadone), s/p sleeve gastrectomy, chronic trigeminal neuralgia, here for CPE  - history of chronic trigeminal neuralgia, seeing neuro  started on amytriptiline for a week   which she feeling is making her brain fog and increasing appetite tried cabamazepine, oxcarbamazepine, gabapentin and lamictal which gave her worsening side effects lamictal caused skin reaction finding working as  difficult- due to chronic pain and trigeminal neuralgia causing pain with talking  has been feeling more depressed starting drinking alcohol again  went into withdrawl and was hospitalized at [facility] on 6/10/2024 stopped after detox  will be starting outpatient - [facility]  On methadone 16mg QD (prescribed by [facility] in [place]) denies any other associated symptoms  denies any other complaints or concerns at this time

## 2024-07-29 ENCOUNTER — NON-APPOINTMENT (OUTPATIENT)
Age: 34
End: 2024-07-29

## 2024-08-02 ENCOUNTER — APPOINTMENT (OUTPATIENT)
Dept: FAMILY MEDICINE | Facility: CLINIC | Age: 34
End: 2024-08-02
Payer: MEDICAID

## 2024-08-02 VITALS
SYSTOLIC BLOOD PRESSURE: 122 MMHG | TEMPERATURE: 98.4 F | HEIGHT: 72 IN | RESPIRATION RATE: 16 BRPM | BODY MASS INDEX: 28.58 KG/M2 | WEIGHT: 211 LBS | HEART RATE: 91 BPM | OXYGEN SATURATION: 98 % | DIASTOLIC BLOOD PRESSURE: 75 MMHG

## 2024-08-02 DIAGNOSIS — F11.90 OPIOID USE, UNSPECIFIED, UNCOMPLICATED: ICD-10-CM

## 2024-08-02 DIAGNOSIS — Z98.84 BARIATRIC SURGERY STATUS: ICD-10-CM

## 2024-08-02 DIAGNOSIS — M54.16 RADICULOPATHY, LUMBAR REGION: ICD-10-CM

## 2024-08-02 DIAGNOSIS — F41.9 ANXIETY DISORDER, UNSPECIFIED: ICD-10-CM

## 2024-08-02 DIAGNOSIS — F32.A ANXIETY DISORDER, UNSPECIFIED: ICD-10-CM

## 2024-08-02 DIAGNOSIS — G50.0 TRIGEMINAL NEURALGIA: ICD-10-CM

## 2024-08-02 PROCEDURE — 99214 OFFICE O/P EST MOD 30 MIN: CPT

## 2024-08-02 PROCEDURE — G2211 COMPLEX E/M VISIT ADD ON: CPT | Mod: NC,1L

## 2024-08-05 NOTE — REVIEW OF SYSTEMS
[Joint Pain] : joint pain [Joint Stiffness] : joint stiffness [Negative] : Psychiatric [Headache] : headache

## 2024-08-05 NOTE — HEALTH RISK ASSESSMENT
[No] : No [0] : 2) Feeling down, depressed, or hopeless: Not at all (0) [PHQ-2 Negative - No further assessment needed] : PHQ-2 Negative - No further assessment needed [[id number]] : 0

## 2024-08-05 NOTE — ASSESSMENT
[FreeTextEntry1] : VSS c/w current medications medication as prescribed, medication education done  letter written for patient c/w current management as per neurology follow up in office if sx persists or worsens patient verbalizes understanding and is stable upon d/c.

## 2024-08-05 NOTE — HISTORY OF PRESENT ILLNESS
[FreeTextEntry8] : 33 yo f, pmhx of GERD, anxiety, depression, history of substance abuse (alcohol and on methadone), s/p sleeve gastrectomy, chronic trigeminal neuralgia, here for CPE - history of chronic trigeminal neuralgia, seeing neuro on amytriptiline 10 mg, which has been helping, along with gabapentin 600 mg TID  tried cabamazepine, oxcarbamazepine, gabapentin and lamictal which gave her worsening side effects lamictal caused skin reaction not working currently  finding working as  difficult- due to chronic pain and trigeminal neuralgia causing pain with talking depression symptoms improved with medicine, has not drank alcohol since last visit  tapering off of methadone On methadone 5 mg QD (prescribed by [facility] in [place]) denies any other associated symptoms denies any other complaints or concerns at this time

## 2024-08-05 NOTE — HEALTH RISK ASSESSMENT
[No] : No [0] : 2) Feeling down, depressed, or hopeless: Not at all (0) [PHQ-2 Negative - No further assessment needed] : PHQ-2 Negative - No further assessment needed [[id number]] : 0

## 2024-08-05 NOTE — HISTORY OF PRESENT ILLNESS
[FreeTextEntry8] : 33 yo f, pmhx of GERD, anxiety, depression, history of substance abuse (alcohol and on methadone), s/p sleeve gastrectomy, chronic trigeminal neuralgia, here for CPE - history of chronic trigeminal neuralgia, seeing neuro on amytriptiline 10 mg, which has been helping, along with gabapentin 600 mg TID  tried cabamazepine, oxcarbamazepine, gabapentin and lamictal which gave her worsening side effects lamictal caused skin reaction not working currently  finding working as  difficult- due to chronic pain and trigeminal neuralgia causing pain with talking depression symptoms improved with medicine, has not drank alcohol since last visit  tapering off of methadone On methadone 5 mg QD (prescribed by [facility] in [place]) denies any other associated symptoms denies any other complaints or concerns at this time

## 2024-09-23 ENCOUNTER — NON-APPOINTMENT (OUTPATIENT)
Age: 34
End: 2024-09-23

## 2025-01-14 ENCOUNTER — APPOINTMENT (OUTPATIENT)
Dept: FAMILY MEDICINE | Facility: CLINIC | Age: 35
End: 2025-01-14
Payer: MEDICAID

## 2025-01-14 VITALS
OXYGEN SATURATION: 95 % | HEART RATE: 89 BPM | RESPIRATION RATE: 16 BRPM | DIASTOLIC BLOOD PRESSURE: 70 MMHG | SYSTOLIC BLOOD PRESSURE: 124 MMHG | TEMPERATURE: 98 F | HEIGHT: 72 IN | BODY MASS INDEX: 29.53 KG/M2 | WEIGHT: 218 LBS

## 2025-01-14 DIAGNOSIS — F10.10 ALCOHOL ABUSE, UNCOMPLICATED: ICD-10-CM

## 2025-01-14 DIAGNOSIS — G50.0 TRIGEMINAL NEURALGIA: ICD-10-CM

## 2025-01-14 DIAGNOSIS — Z98.84 BARIATRIC SURGERY STATUS: ICD-10-CM

## 2025-01-14 DIAGNOSIS — F41.9 ANXIETY DISORDER, UNSPECIFIED: ICD-10-CM

## 2025-01-14 DIAGNOSIS — F11.90 OPIOID USE, UNSPECIFIED, UNCOMPLICATED: ICD-10-CM

## 2025-01-14 DIAGNOSIS — R79.89 OTHER SPECIFIED ABNORMAL FINDINGS OF BLOOD CHEMISTRY: ICD-10-CM

## 2025-01-14 DIAGNOSIS — K59.09 OTHER CONSTIPATION: ICD-10-CM

## 2025-01-14 DIAGNOSIS — F32.A ANXIETY DISORDER, UNSPECIFIED: ICD-10-CM

## 2025-01-14 PROCEDURE — 96372 THER/PROPH/DIAG INJ SC/IM: CPT

## 2025-01-14 PROCEDURE — 99214 OFFICE O/P EST MOD 30 MIN: CPT | Mod: 25

## 2025-01-14 RX ORDER — SENNOSIDES 8.6 MG TABLETS 8.6 MG/1
8.6 TABLET ORAL
Qty: 60 | Refills: 5 | Status: ACTIVE | COMMUNITY
Start: 2025-01-14 | End: 1900-01-01

## 2025-01-14 RX ORDER — GABAPENTIN 100 MG
100 TABLET ORAL
Refills: 0 | Status: ACTIVE | COMMUNITY

## 2025-01-14 RX ORDER — CYANOCOBALAMIN 1000 UG/ML
1000 INJECTION, SOLUTION INTRAMUSCULAR; SUBCUTANEOUS
Qty: 0 | Refills: 0 | Status: COMPLETED | OUTPATIENT
Start: 2025-01-14

## 2025-01-14 RX ORDER — NORTRIPTYLINE HYDROCHLORIDE 10 MG/1
10 CAPSULE ORAL
Refills: 0 | Status: ACTIVE | COMMUNITY

## 2025-01-14 RX ADMIN — CYANOCOBALAMIN 0 MCG/ML: 1000 INJECTION, SOLUTION INTRAMUSCULAR; SUBCUTANEOUS at 00:00

## 2025-07-15 ENCOUNTER — NON-APPOINTMENT (OUTPATIENT)
Age: 35
End: 2025-07-15